# Patient Record
Sex: MALE | Employment: UNEMPLOYED | URBAN - METROPOLITAN AREA
[De-identification: names, ages, dates, MRNs, and addresses within clinical notes are randomized per-mention and may not be internally consistent; named-entity substitution may affect disease eponyms.]

---

## 2017-02-12 ENCOUNTER — APPOINTMENT (OUTPATIENT)
Dept: GENERAL RADIOLOGY | Facility: CLINIC | Age: 47
End: 2017-02-12
Attending: EMERGENCY MEDICINE

## 2017-02-12 ENCOUNTER — HOSPITAL ENCOUNTER (EMERGENCY)
Facility: CLINIC | Age: 47
Discharge: HOME | End: 2017-02-13
Attending: EMERGENCY MEDICINE | Admitting: EMERGENCY MEDICINE

## 2017-02-12 DIAGNOSIS — J32.9 SINUSITIS: ICD-10-CM

## 2017-02-12 DIAGNOSIS — J06.9 UPPER RESPIRATORY TRACT INFECTION, UNSPECIFIED TYPE: ICD-10-CM

## 2017-02-12 PROCEDURE — 99283 EMERGENCY DEPT VISIT LOW MDM: CPT | Mod: Z6 | Performed by: EMERGENCY MEDICINE

## 2017-02-12 PROCEDURE — 99283 EMERGENCY DEPT VISIT LOW MDM: CPT | Performed by: EMERGENCY MEDICINE

## 2017-02-12 PROCEDURE — 70220 X-RAY EXAM OF SINUSES: CPT

## 2017-02-12 RX ORDER — SULFAMETHOXAZOLE/TRIMETHOPRIM 800-160 MG
1 TABLET ORAL 2 TIMES DAILY
Qty: 20 TABLET | Refills: 0 | Status: SHIPPED | OUTPATIENT
Start: 2017-02-12 | End: 2018-12-09

## 2017-02-12 RX ORDER — FLUTICASONE PROPIONATE 50 MCG
2 SPRAY, SUSPENSION (ML) NASAL DAILY
Qty: 1 BOTTLE | Refills: 0 | Status: SHIPPED | OUTPATIENT
Start: 2017-02-12

## 2017-02-12 RX ORDER — HYDROCODONE BITARTRATE AND ACETAMINOPHEN 5; 325 MG/1; MG/1
1-2 TABLET ORAL EVERY 6 HOURS PRN
Qty: 10 TABLET | Refills: 0 | Status: SHIPPED | OUTPATIENT
Start: 2017-02-12

## 2017-02-12 NOTE — ED AVS SNAPSHOT
[facility], [place], Emergency Department    [address]    [facility] [address]    Phone:  [phone]    Fax:  [phone]                                       Zak Bey   MRN: [id number]    Department:  [facility], Emergency Department   Date of Visit:  2/12/2017           After Visit Summary Signature Page     I have received my discharge instructions, and my questions have been answered. I have discussed any challenges I see with this plan with the nurse or doctor.    ..........................................................................................................................................  Patient/Patient Representative Signature      ..........................................................................................................................................  Patient Representative Print Name and Relationship to Patient    ..................................................               ................................................  Date                                            Time    ..........................................................................................................................................  Reviewed by Signature/Title    ...................................................              ..............................................  Date                                                            Time

## 2017-02-12 NOTE — ED AVS SNAPSHOT
[facility], Emergency Department    [address] [place] [address]    Phone:  [phone]    Fax:  [phone]                                       Zak Bey   MRN: [id number]    Department:  [facility], Emergency Department   Date of Visit:  2/12/2017           Patient Information     Date Of Birth          1970        Your diagnoses for this visit were:     Upper respiratory tract infection, unspecified type with partially treated sinusitis       You were seen by Michael Youssef MD.        Discharge Instructions       Please make an appointment to follow up with Your Primary Care Provider in 10-14 days for recheck.    Discharge References/Attachments     SINUSITIS (ANTIBIOTIC TREATMENT) ([place])      24 Hour Appointment Hotline       To make an appointment at any [facility] clinic, call [phone] ([phone]). If you don't have a family doctor or clinic, we will help you find one. [facility] clinics are conveniently located to serve the needs of you and your family.             Review of your medicines      START taking        Dose / Directions Last dose taken    ENTEX LA  MG Tb12   Dose:  1 tablet   Quantity:  10 tablet   Generic drug:  Phenylephrine-Guaifenesin        Take 1 tablet by mouth 2 times daily for 5 days   Refills:  0        sulfamethoxazole-trimethoprim 800-160 MG per tablet   Commonly known as:  BACTRIM DS/SEPTRA DS   Dose:  1 tablet   Quantity:  20 tablet        Take 1 tablet by mouth 2 times daily   Refills:  0          CONTINUE these medicines which may have CHANGED, or have new prescriptions. If we are uncertain of the size of tablets/capsules you have at home, strength may be listed as something that might have changed.        Dose / Directions Last dose taken    fluticasone 50 MCG/ACT spray   Commonly known as:  FLONASE   Dose:  2 spray   What changed:  how much to take   Quantity:  1 Bottle        Spray 2 sprays into both  nostrils daily   Refills:  0        HYDROcodone-acetaminophen 5-325 MG per tablet   Commonly known as:  NORCO   Dose:  1-2 tablet   What changed:  how much to take   Quantity:  10 tablet        Take 1-2 tablets by mouth every 6 hours as needed for moderate to severe pain   Refills:  0          Our records show that you are taking the medicines listed below. If these are incorrect, please call your family doctor or clinic.        Dose / Directions Last dose taken    acetaminophen 325 MG tablet   Commonly known as:  TYLENOL   Dose:  650 mg   Quantity:  60 tablet        Take 2 tablets (650 mg) by mouth every 6 hours as needed for mild pain   Refills:  0        cetirizine 10 MG tablet   Commonly known as:  zyrTEC   Dose:  10 mg        Take 10 mg by mouth daily   Refills:  0        famotidine 20 MG tablet   Commonly known as:  PEPCID   Dose:  20 mg   Quantity:  30 tablet        Take 1 tablet (20 mg) by mouth 2 times daily   Refills:  0        ipratropium 0.06 % spray   Commonly known as:  ATROVENT   Dose:  2 spray   Quantity:  1 Box        Spray 2 sprays into both nostrils 4 times daily as needed for rhinitis   Refills:  1        sodium chloride 0.65 % nasal spray   Commonly known as:  NASAL MOIST   Dose:  2 spray   Quantity:  1 Bottle        Spray 2 sprays into both nostrils daily as needed for congestion   Refills:  0          STOP taking        Dose Reason for stopping Comments    oxyCODONE 5 MG IR tablet   Commonly known as:  ROXICODONE              ZITHROMAX PO                      Prescriptions were sent or printed at these locations (4 Prescriptions)                   Other Prescriptions                Printed at Department/Unit printer (4 of 4)         fluticasone (FLONASE) 50 MCG/ACT spray               sulfamethoxazole-trimethoprim (BACTRIM DS/SEPTRA DS) 800-160 MG per tablet               ENTEX LA  MG TB12               HYDROcodone-acetaminophen (NORCO) 5-325 MG per tablet                Procedures and  "tests performed during your visit     XR Sinus Complete G/E 3 Views      Orders Needing Specimen Collection     None      Pending Results     Date and Time Order Name Status Description    2017 2236 XR Sinus Complete G/E 3 Views Preliminary             Pending Culture Results     No orders found from 2/10/2017 to 2017.            Thank you for choosing [facility]       Thank you for choosing [facility] for your care. Our goal is always to provide you with excellent care. Hearing back from our patients is one way we can continue to improve our services. Please take a few minutes to complete the written survey that you may receive in the mail after you visit with us. Thank you!        [employer] Information     [employer] lets you send messages to your doctor, view your test results, renew your prescriptions, schedule appointments and more. To sign up, go to [email or web address] . Click on \"Log in\" on the left side of the screen, which will take you to the Welcome page. Then click on \"Sign up Now\" on the right side of the page.     You will be asked to enter the access code listed below, as well as some personal information. Please follow the directions to create your username and password.     Your access code is: [email or web address]  Expires: 2017 11:34 PM     Your access code will  in 90 days. If you need help or a new code, please call your [facility] clinic or [phone].        Care EveryWhere ID     This is your Care EveryWhere ID. This could be used by other organizations to access your [facility] medical records  [id number]        After Visit Summary       This is your record. Keep this with you and show to your community pharmacist(s) and doctor(s) at your next visit.                  "

## 2017-02-13 VITALS
SYSTOLIC BLOOD PRESSURE: 129 MMHG | DIASTOLIC BLOOD PRESSURE: 89 MMHG | OXYGEN SATURATION: 93 % | HEIGHT: 69 IN | BODY MASS INDEX: 32.73 KG/M2 | HEART RATE: 78 BPM | RESPIRATION RATE: 20 BRPM | WEIGHT: 221 LBS | TEMPERATURE: 97.7 F

## 2017-02-13 NOTE — DISCHARGE INSTRUCTIONS
Please make an appointment to follow up with Your Primary Care Provider in 10-14 days for recheck.

## 2017-02-13 NOTE — ED NOTES
Sinusitis started 1 1/2 weeks ago. Monday of last week went to [facility] and received ABX, symptoms have gotten worse since then. Also c/o headache and being unable to sleep due to pain.

## 2017-02-13 NOTE — ED PROVIDER NOTES
History     Chief Complaint   Patient presents with     Sinusitis     Sinusitis started 1 1/2 weeks ago. Monday of last week went to [facility] and received ABX, symptoms have gotten worse since then. Also c/o headache and being unable to sleep due to pain.      HPI  Zak Bey is a 46 year old male who states that over the last w3eek and a half he has had sinus congestion and pain. The patient states that he has been treated with Zithromax recently but it has not helped. The patient complains mostly of sinus congestion but also states that he has some sore throat as well. The patient has had no vomiting, no coughing, no shortness of breath and denies any generalized headaches stating that all of his pressure and pain is in his sinuses.    I have reviewed the Medications, Allergies, Past Medical and Surgical History, and Social History in the Epic system.    This part of the document was transcribed by Ky Stokes for Michael Youssef MD.    Previous Medications    ACETAMINOPHEN (TYLENOL) 325 MG TABLET    Take 2 tablets (650 mg) by mouth every 6 hours as needed for mild pain    CETIRIZINE (ZYRTEC) 10 MG TABLET    Take 10 mg by mouth daily    FAMOTIDINE (PEPCID) 20 MG TABLET    Take 1 tablet (20 mg) by mouth 2 times daily    IPRATROPIUM (ATROVENT) 0.06 % NASAL SPRAY    Spray 2 sprays into both nostrils 4 times daily as needed for rhinitis    SODIUM CHLORIDE (NASAL MOIST) 0.65 % NASAL SPRAY    Spray 2 sprays into both nostrils daily as needed for congestion     Past Medical History   Diagnosis Date     Depressive disorder        Past Surgical History   Procedure Laterality Date     Orthopedic surgery       back surgery       No family history on file.    Social History   Substance Use Topics     Smoking status: Former Smoker     Quit date: 4/28/2003     Smokeless tobacco: Not on file     Alcohol use Yes      Comment: sometimes        Allergies   Allergen Reactions     Penicillins        Review of  "Systems   ROS: 10 point ROS neg other than the symptoms noted above in the HPI.    Physical Exam   BP: 154/86  Heart Rate: 85  Temp: 96.3  F (35.7  C)  Resp: 16  Height: 175 cm (5' 8.9\")  Weight: 100.2 kg (221 lb)  SpO2: 95 %  Physical Exam   Constitutional: He is oriented to person, place, and time.   Alert and conversant pleasant   HENT:   Head: Atraumatic.   Mouth/Throat: Oropharynx is clear and moist.   There is sinus tenderness to percussion in the frontal and maxillary sinuses bilaterally.   Eyes: EOM are normal. Pupils are equal, round, and reactive to light.   Neck: Neck supple.   Cardiovascular: Normal heart sounds.    Pulmonary/Chest: Breath sounds normal. He has no wheezes.   Musculoskeletal: He exhibits no edema.   Neurological: He is alert and oriented to person, place, and time.   Grossly intact and symmetric   Skin: Skin is warm.   Psychiatric: He has a normal mood and affect.       ED Course     ED Course     Procedures            Assessments & Plan (with Medical Decision Making)     I have reviewed the nursing notes.    Patient's sinus x-rays revealed some possible mucosal swelling but no definite air-fluid levels and the patient sinuses.  This may represent a partially treated sinusitis since the patient has been on Zithromax.  Prescription database was accessed and found no evidence of abuse of pain medicines.    I have reviewed the findings, diagnosis, plan and need for follow up with the patient.    Patient's Medications   New Prescriptions    ENTEX LA  MG TB12    Take 1 tablet by mouth 2 times daily for 5 days    HYDROCODONE-ACETAMINOPHEN (NORCO) 5-325 MG PER TABLET    Take 1-2 tablets by mouth every 6 hours as needed for moderate to severe pain    SULFAMETHOXAZOLE-TRIMETHOPRIM (BACTRIM DS/SEPTRA DS) 800-160 MG PER TABLET    Take 1 tablet by mouth 2 times daily   Previous Medications    ACETAMINOPHEN (TYLENOL) 325 MG TABLET    Take 2 tablets (650 mg) by mouth every 6 hours as needed for " mild pain    CETIRIZINE (ZYRTEC) 10 MG TABLET    Take 10 mg by mouth daily    FAMOTIDINE (PEPCID) 20 MG TABLET    Take 1 tablet (20 mg) by mouth 2 times daily    IPRATROPIUM (ATROVENT) 0.06 % NASAL SPRAY    Spray 2 sprays into both nostrils 4 times daily as needed for rhinitis    SODIUM CHLORIDE (NASAL MOIST) 0.65 % NASAL SPRAY    Spray 2 sprays into both nostrils daily as needed for congestion   Modified Medications    Modified Medication Previous Medication    FLUTICASONE (FLONASE) 50 MCG/ACT SPRAY fluticasone (FLONASE) 50 MCG/ACT nasal spray       Spray 2 sprays into both nostrils daily    Spray 1-2 sprays into both nostrils daily   Discontinued Medications    AZITHROMYCIN (ZITHROMAX PO)        HYDROCODONE-ACETAMINOPHEN (NORCO) 5-325 MG PER TABLET    Take 1 tablet by mouth every 6 hours as needed for moderate to severe pain    OXYCODONE (ROXICODONE) 5 MG IMMEDIATE RELEASE TABLET    Take 1 tablet (5 mg) by mouth every 6 hours as needed for pain       Final diagnoses:   Upper respiratory tract infection, unspecified type - with partially treated sinusitis     Please make an appointment to follow up with Your Primary Care Provider in 10-14 days for recheck.    Routine discharge instructions were given for this diagnosis    Michael Youssef MD    2/12/2017   [facility], [place], EMERGENCY DEPARTMENT     Michael Youssef MD  02/12/17 9126

## 2018-12-09 ENCOUNTER — HOSPITAL ENCOUNTER (EMERGENCY)
Facility: CLINIC | Age: 48
Discharge: HOME | End: 2018-12-09
Attending: EMERGENCY MEDICINE | Admitting: EMERGENCY MEDICINE
Payer: MEDICAID

## 2018-12-09 ENCOUNTER — APPOINTMENT (OUTPATIENT)
Dept: GENERAL RADIOLOGY | Facility: CLINIC | Age: 48
End: 2018-12-09
Attending: EMERGENCY MEDICINE
Payer: MEDICAID

## 2018-12-09 VITALS
WEIGHT: 214.25 LBS | TEMPERATURE: 97.8 F | BODY MASS INDEX: 31.73 KG/M2 | RESPIRATION RATE: 16 BRPM | DIASTOLIC BLOOD PRESSURE: 91 MMHG | SYSTOLIC BLOOD PRESSURE: 139 MMHG | HEART RATE: 70 BPM | OXYGEN SATURATION: 99 %

## 2018-12-09 DIAGNOSIS — Z87.891 PERSONAL HISTORY OF TOBACCO USE, PRESENTING HAZARDS TO HEALTH: ICD-10-CM

## 2018-12-09 DIAGNOSIS — J18.9 PNEUMONIA OF RIGHT UPPER LOBE DUE TO INFECTIOUS ORGANISM: ICD-10-CM

## 2018-12-09 PROCEDURE — 71046 X-RAY EXAM CHEST 2 VIEWS: CPT

## 2018-12-09 PROCEDURE — 99284 EMERGENCY DEPT VISIT MOD MDM: CPT | Mod: Z6 | Performed by: EMERGENCY MEDICINE

## 2018-12-09 PROCEDURE — 99283 EMERGENCY DEPT VISIT LOW MDM: CPT | Mod: 25

## 2018-12-09 RX ORDER — AZITHROMYCIN 250 MG/1
TABLET, FILM COATED ORAL
Qty: 6 TABLET | Refills: 0 | Status: SHIPPED | OUTPATIENT
Start: 2018-12-09

## 2018-12-09 ASSESSMENT — ENCOUNTER SYMPTOMS
SHORTNESS OF BREATH: 1
HEADACHES: 1
CHILLS: 1
ABDOMINAL PAIN: 0
RHINORRHEA: 1
VOMITING: 0
FEVER: 0
BACK PAIN: 0
SORE THROAT: 1
COUGH: 1
NAUSEA: 0

## 2018-12-09 NOTE — ED PROVIDER NOTES
History     Chief Complaint   Patient presents with     Pharyngitis     has had a headache and sore throat for the 2 months worse this pasts 1 week      HPI  Zak Bey is a 48 year old male Presents to the Emergency Department with complaints of sore throat, runny nose, cough, headache, and chills for the past two months that has worsened in the last week. He presents with his son who translates for him.  He also feels like his lungs are closing in on him and it s hard for him to breathe. He only has allergies to penicillin. He would like to try antibiotics.     Of note, the patient was seen last month, 11/05 and a CT was performed and states they gave him nothing for symptomatic relief. He was also seen by ENT 10/31/18 and was diagnosed with allergic rhinitis and chronic sinusitis.     CT sinuses no IV contrast:  Impression:     Mild mucosal thickening in the paranasal sinuses. No air-fluid levels to suggest acute sinusitis. Mild mucosal thickening along the right infundibulum without obliteration of the ostiomeatal unit.     I have reviewed the Medications, Allergies, Past Medical and Surgical History, and Social History in the Shortlist system.  Past Medical History:   Diagnosis Date     Depressive disorder        Past Surgical History:   Procedure Laterality Date     ORTHOPEDIC SURGERY      back surgery       No family history on file.    Social History     Tobacco Use     Smoking status: Former Smoker     Last attempt to quit: 4/28/2003     Years since quitting: 15.6     Smokeless tobacco: Never Used   Substance Use Topics     Alcohol use: Yes     Comment: sometimes       No current facility-administered medications for this encounter.      Current Outpatient Medications   Medication     acetaminophen (TYLENOL) 325 MG tablet     azithromycin (ZITHROMAX Z-WAN) 250 MG tablet     famotidine (PEPCID) 20 MG tablet     cetirizine (ZYRTEC) 10 MG tablet     cetirizine (ZYRTEC) 10 MG tablet     fluticasone  (FLONASE) 50 MCG/ACT spray     HYDROcodone-acetaminophen (NORCO) 5-325 MG per tablet     ibuprofen (ADVIL,MOTRIN) 800 MG tablet     ipratropium (ATROVENT) 0.06 % nasal spray     sodium chloride (NASAL MOIST) 0.65 % nasal spray        Allergies   Allergen Reactions     Penicillins        Review of Systems   Constitutional: Positive for chills. Negative for fever.   HENT: Positive for congestion, rhinorrhea and sore throat.    Respiratory: Positive for cough and shortness of breath.    Cardiovascular: Negative for chest pain.   Gastrointestinal: Negative for abdominal pain, nausea and vomiting.   Musculoskeletal: Negative for back pain.   Neurological: Positive for headaches.   All other systems reviewed and are negative.      Physical Exam   BP: (!) 148/95  Pulse: 70  Heart Rate: 57  Temp: 97.8  F (36.6  C)  Resp: 16  Weight: 97.2 kg (214 lb 4 oz)  SpO2: 96 %      Physical Exam   Constitutional: He is oriented to person, place, and time. He appears well-developed.   HENT:   Head: Normocephalic.   Eyes: Pupils are equal, round, and reactive to light.   Neck: Normal range of motion. Neck supple.   Cardiovascular: Normal rate and regular rhythm.   Pulmonary/Chest: Effort normal. He has rhonchi ( Scattered).   Abdominal: Soft. Bowel sounds are normal.   Musculoskeletal: Normal range of motion.   Neurological: He is alert and oriented to person, place, and time.   Skin: Skin is warm and dry. Capillary refill takes less than 2 seconds.   Psychiatric: He has a normal mood and affect.       ED Course        Procedures             Results for orders placed or performed during the hospital encounter of 12/09/18   XR Chest 2 Views    Narrative    CHEST TWO VIEWS  12/9/2018 12:50 PM     HISTORY: Chest pain.    COMPARISON: 9/5/2015      Impression    IMPRESSION: New left lower lobe infiltrate consistent with pneumonia.  Otherwise normal.    ALFONZO BENITES MD       Labs Ordered and Resulted from Time of ED Arrival Up to the Time  of Departure from the ED - No data to display         Assessments & Plan (with Medical Decision Making)   48-year-old male presents with nearly 2-month history of intermittent URI type symptoms.  Exam revealed normal vital signs with scattered rhonchi.  Differential includes URI, sinusitis, asthma, bronchospasm, pneumonia, pneumonitis.  Chest x-ray was obtained revealing new left lower lobe infiltrate.  Signs and symptoms consistent with pneumonia.  Patient will be discharged with instructions on use of azithromycin.    I have reviewed the nursing notes.    I have reviewed the findings, diagnosis, plan and need for follow up with the patient.       Review of your medicines      UNREVIEWED medicines. Ask your doctor about these medicines      Dose / Directions   acetaminophen 325 MG tablet  Commonly known as:  TYLENOL      Dose:  650 mg  Take 2 tablets (650 mg) by mouth every 6 hours as needed for mild pain  Quantity:  60 tablet  Refills:  0     * cetirizine 10 MG tablet  Commonly known as:  zyrTEC      Dose:  10 mg  Take 10 mg by mouth daily  Refills:  0     * cetirizine 10 MG tablet  Commonly known as:  zyrTEC      Dose:  10 mg  Take 1 tablet by mouth daily for 30 days.  Quantity:  30 tablet  Refills:  0     famotidine 20 MG tablet  Commonly known as:  PEPCID      Dose:  20 mg  Take 1 tablet (20 mg) by mouth 2 times daily  Quantity:  30 tablet  Refills:  0     fluticasone 50 MCG/ACT nasal spray  Commonly known as:  FLONASE      Dose:  2 spray  Spray 2 sprays into both nostrils daily  Quantity:  1 Bottle  Refills:  0     HYDROcodone-acetaminophen 5-325 MG tablet  Commonly known as:  NORCO      Dose:  1-2 tablet  Take 1-2 tablets by mouth every 6 hours as needed for moderate to severe pain  Quantity:  10 tablet  Refills:  0     ibuprofen 800 MG tablet  Commonly known as:  ADVIL/MOTRIN      Dose:  800 mg  Take 1 tablet by mouth every 8 hours as needed for pain for 8 days.  Quantity:  24 tablet  Refills:  0      ipratropium 0.06 % nasal spray  Commonly known as:  ATROVENT      Dose:  2 spray  Spray 2 sprays into both nostrils 4 times daily as needed for rhinitis  Quantity:  1 Box  Refills:  1     sodium chloride 0.65 % nasal spray  Commonly known as:  NASAL MOIST      Dose:  2 spray  Spray 2 sprays into both nostrils daily as needed for congestion  Quantity:  1 Bottle  Refills:  0         * This list has 2 medication(s) that are the same as other medications prescribed for you. Read the directions carefully, and ask your doctor or other care provider to review them with you.            START taking      Dose / Directions   azithromycin 250 MG tablet  Commonly known as:  ZITHROMAX Z-WAN      Take to the first day then 1 every day until gone  Quantity:  6 tablet  Refills:  0           Where to get your medicines      Some of these will need a paper prescription and others can be bought over the counter. Ask your nurse if you have questions.    Bring a paper prescription for each of these medications    azithromycin 250 MG tablet         Final diagnoses:   Pneumonia of right upper lobe due to infectious organism (H)     Sharyn MONGE, am serving as a trained medical scribe to document services personally performed by Erick Valle MD, based on the provider's statements to me.   Erick MONGE MD, was physically present and have reviewed and verified the accuracy of this note documented by Sharyn Montes De Oca.    12/9/2018   [facility], [place], EMERGENCY DEPARTMENT     Gonzales Valle MD  12/09/18 0990

## 2019-07-01 ENCOUNTER — APPOINTMENT (OUTPATIENT)
Dept: GENERAL RADIOLOGY | Facility: CLINIC | Age: 49
End: 2019-07-01
Attending: EMERGENCY MEDICINE
Payer: MEDICAID

## 2019-07-01 ENCOUNTER — APPOINTMENT (OUTPATIENT)
Dept: CT IMAGING | Facility: CLINIC | Age: 49
End: 2019-07-01
Attending: EMERGENCY MEDICINE
Payer: MEDICAID

## 2019-07-01 ENCOUNTER — HOSPITAL ENCOUNTER (EMERGENCY)
Facility: CLINIC | Age: 49
Discharge: HOME | End: 2019-07-01
Attending: EMERGENCY MEDICINE | Admitting: EMERGENCY MEDICINE
Payer: MEDICAID

## 2019-07-01 VITALS
HEART RATE: 72 BPM | DIASTOLIC BLOOD PRESSURE: 81 MMHG | WEIGHT: 214.2 LBS | SYSTOLIC BLOOD PRESSURE: 138 MMHG | BODY MASS INDEX: 31.73 KG/M2 | TEMPERATURE: 97.8 F | RESPIRATION RATE: 16 BRPM | OXYGEN SATURATION: 98 %

## 2019-07-01 DIAGNOSIS — I10 HTN (HYPERTENSION), BENIGN: ICD-10-CM

## 2019-07-01 DIAGNOSIS — M54.2 NECK PAIN: ICD-10-CM

## 2019-07-01 DIAGNOSIS — M54.6 ACUTE THORACIC BACK PAIN, UNSPECIFIED BACK PAIN LATERALITY: ICD-10-CM

## 2019-07-01 DIAGNOSIS — M79.10 MYALGIA: ICD-10-CM

## 2019-07-01 LAB
ALBUMIN SERPL-MCNC: 3.8 G/DL (ref 3.4–5)
ALP SERPL-CCNC: 85 U/L (ref 40–150)
ALT SERPL W P-5'-P-CCNC: 45 U/L (ref 0–70)
ANION GAP SERPL CALCULATED.3IONS-SCNC: 5 MMOL/L (ref 3–14)
AST SERPL W P-5'-P-CCNC: 22 U/L (ref 0–45)
BASOPHILS # BLD AUTO: 0 10E9/L (ref 0–0.2)
BASOPHILS NFR BLD AUTO: 0.4 %
BILIRUB SERPL-MCNC: 0.4 MG/DL (ref 0.2–1.3)
BUN SERPL-MCNC: 11 MG/DL (ref 7–30)
CALCIUM SERPL-MCNC: 8.3 MG/DL (ref 8.5–10.1)
CHLORIDE SERPL-SCNC: 108 MMOL/L (ref 94–109)
CO2 SERPL-SCNC: 29 MMOL/L (ref 20–32)
CREAT SERPL-MCNC: 0.96 MG/DL (ref 0.66–1.25)
DIFFERENTIAL METHOD BLD: ABNORMAL
EOSINOPHIL # BLD AUTO: 0.8 10E9/L (ref 0–0.7)
EOSINOPHIL NFR BLD AUTO: 9.1 %
ERYTHROCYTE [DISTWIDTH] IN BLOOD BY AUTOMATED COUNT: 12.6 % (ref 10–15)
GFR SERPL CREATININE-BSD FRML MDRD: >90 ML/MIN/{1.73_M2}
GLUCOSE SERPL-MCNC: 86 MG/DL (ref 70–99)
HCT VFR BLD AUTO: 42.6 % (ref 40–53)
HGB BLD-MCNC: 14.9 G/DL (ref 13.3–17.7)
IMM GRANULOCYTES # BLD: 0 10E9/L (ref 0–0.4)
IMM GRANULOCYTES NFR BLD: 0.4 %
LYMPHOCYTES # BLD AUTO: 2.8 10E9/L (ref 0.8–5.3)
LYMPHOCYTES NFR BLD AUTO: 32.9 %
MCH RBC QN AUTO: 31.7 PG (ref 26.5–33)
MCHC RBC AUTO-ENTMCNC: 35 G/DL (ref 31.5–36.5)
MCV RBC AUTO: 91 FL (ref 78–100)
MONOCYTES # BLD AUTO: 0.5 10E9/L (ref 0–1.3)
MONOCYTES NFR BLD AUTO: 5.9 %
NEUTROPHILS # BLD AUTO: 4.3 10E9/L (ref 1.6–8.3)
NEUTROPHILS NFR BLD AUTO: 51.3 %
NRBC # BLD AUTO: 0 10*3/UL
NRBC BLD AUTO-RTO: 0 /100
PLATELET # BLD AUTO: 193 10E9/L (ref 150–450)
POTASSIUM SERPL-SCNC: 3.6 MMOL/L (ref 3.4–5.3)
PROT SERPL-MCNC: 6.9 G/DL (ref 6.8–8.8)
RBC # BLD AUTO: 4.7 10E12/L (ref 4.4–5.9)
SODIUM SERPL-SCNC: 142 MMOL/L (ref 133–144)
TROPONIN I SERPL-MCNC: <0.015 UG/L (ref 0–0.04)
WBC # BLD AUTO: 8.4 10E9/L (ref 4–11)

## 2019-07-01 PROCEDURE — 25800030 ZZH RX IP 258 OP 636: Performed by: STUDENT IN AN ORGANIZED HEALTH CARE EDUCATION/TRAINING PROGRAM

## 2019-07-01 PROCEDURE — 25000128 H RX IP 250 OP 636: Performed by: STUDENT IN AN ORGANIZED HEALTH CARE EDUCATION/TRAINING PROGRAM

## 2019-07-01 PROCEDURE — 72072 X-RAY EXAM THORAC SPINE 3VWS: CPT

## 2019-07-01 PROCEDURE — 96361 HYDRATE IV INFUSION ADD-ON: CPT | Performed by: EMERGENCY MEDICINE

## 2019-07-01 PROCEDURE — 84484 ASSAY OF TROPONIN QUANT: CPT | Performed by: STUDENT IN AN ORGANIZED HEALTH CARE EDUCATION/TRAINING PROGRAM

## 2019-07-01 PROCEDURE — 25000128 H RX IP 250 OP 636: Performed by: EMERGENCY MEDICINE

## 2019-07-01 PROCEDURE — 72125 CT NECK SPINE W/O DYE: CPT

## 2019-07-01 PROCEDURE — 96372 THER/PROPH/DIAG INJ SC/IM: CPT | Performed by: EMERGENCY MEDICINE

## 2019-07-01 PROCEDURE — 85025 COMPLETE CBC W/AUTO DIFF WBC: CPT | Performed by: STUDENT IN AN ORGANIZED HEALTH CARE EDUCATION/TRAINING PROGRAM

## 2019-07-01 PROCEDURE — 96360 HYDRATION IV INFUSION INIT: CPT | Mod: 59 | Performed by: EMERGENCY MEDICINE

## 2019-07-01 PROCEDURE — 25800030 ZZH RX IP 258 OP 636: Performed by: EMERGENCY MEDICINE

## 2019-07-01 PROCEDURE — 99285 EMERGENCY DEPT VISIT HI MDM: CPT | Mod: Z6 | Performed by: EMERGENCY MEDICINE

## 2019-07-01 PROCEDURE — 99285 EMERGENCY DEPT VISIT HI MDM: CPT | Mod: 25 | Performed by: EMERGENCY MEDICINE

## 2019-07-01 PROCEDURE — 80053 COMPREHEN METABOLIC PANEL: CPT | Performed by: STUDENT IN AN ORGANIZED HEALTH CARE EDUCATION/TRAINING PROGRAM

## 2019-07-01 PROCEDURE — 71260 CT THORAX DX C+: CPT

## 2019-07-01 RX ORDER — SODIUM CHLORIDE 9 MG/ML
1000 INJECTION, SOLUTION INTRAVENOUS CONTINUOUS
Status: DISCONTINUED | OUTPATIENT
Start: 2019-07-01 | End: 2019-07-01 | Stop reason: HOSPADM

## 2019-07-01 RX ORDER — IOPAMIDOL 755 MG/ML
100 INJECTION, SOLUTION INTRAVASCULAR ONCE
Status: COMPLETED | OUTPATIENT
Start: 2019-07-01 | End: 2019-07-01

## 2019-07-01 RX ORDER — CYCLOBENZAPRINE HCL 10 MG
10 TABLET ORAL 3 TIMES DAILY PRN
Qty: 20 TABLET | Refills: 0 | Status: SHIPPED | OUTPATIENT
Start: 2019-07-01 | End: 2019-07-07

## 2019-07-01 RX ORDER — SODIUM CHLORIDE 9 MG/ML
100 INJECTION, SOLUTION INTRAVENOUS ONCE
Status: COMPLETED | OUTPATIENT
Start: 2019-07-01 | End: 2019-07-01

## 2019-07-01 RX ORDER — KETOROLAC TROMETHAMINE 15 MG/ML
15 INJECTION, SOLUTION INTRAMUSCULAR; INTRAVENOUS ONCE
Status: COMPLETED | OUTPATIENT
Start: 2019-07-01 | End: 2019-07-01

## 2019-07-01 RX ADMIN — IOPAMIDOL 72 ML: 755 INJECTION, SOLUTION INTRAVENOUS at 20:13

## 2019-07-01 RX ADMIN — SODIUM CHLORIDE 1000 ML: 9 INJECTION, SOLUTION INTRAVENOUS at 18:48

## 2019-07-01 RX ADMIN — SODIUM CHLORIDE 90 ML: 9 INJECTION, SOLUTION INTRAVENOUS at 20:13

## 2019-07-01 RX ADMIN — HYDROMORPHONE HYDROCHLORIDE 1 MG: 1 INJECTION, SOLUTION INTRAMUSCULAR; INTRAVENOUS; SUBCUTANEOUS at 18:21

## 2019-07-01 RX ADMIN — KETOROLAC TROMETHAMINE 15 MG: 15 INJECTION, SOLUTION INTRAMUSCULAR; INTRAVENOUS at 18:19

## 2019-07-01 RX ADMIN — SODIUM CHLORIDE 1000 ML: 9 INJECTION, SOLUTION INTRAVENOUS at 20:27

## 2019-07-01 ASSESSMENT — ENCOUNTER SYMPTOMS
FEVER: 0
FATIGUE: 0
NECK STIFFNESS: 0
CHOKING: 0
ABDOMINAL PAIN: 0
BACK PAIN: 1
NECK PAIN: 1
CHEST TIGHTNESS: 0
NAUSEA: 1
ABDOMINAL DISTENTION: 0
SHORTNESS OF BREATH: 0
NUMBNESS: 0

## 2019-07-01 NOTE — ED PROVIDER NOTES
History     Chief Complaint   Patient presents with     Back Pain     Pt c/o (L) upper and lower back pain. Started 2 days ago. No known trauma     HPI  Zak Bey is a 49 year old male who complains of upper back pain that started yesterday. The pain was sudden in onset, burning, 8/10, and moves to his neck sometimes. The pain is worse when he sits and bends to pick items up. He used naproxen yesterday but the pain persisted. When the pain started, he also started to feel nauseous.There's no history of preceding trauma to the neck or upper back. There's normal range of motion in all limbs, no loss of sensation, or  numbness in all limbs.  He also complains of lower back pain that started about 3 months ago. He has used naproxen and steroid injections for the pain but he says that they have not been very helpful.  He is diabetic on metformin. He doesn't drink alcohol or smoke cigarettes    I have reviewed the Medications, Allergies, Past Medical and Surgical History, and Social History in the [employer] system.    Past Medical History:   Diagnosis Date     Depressive disorder        Past Surgical History:   Procedure Laterality Date     ORTHOPEDIC SURGERY      back surgery       History reviewed. No pertinent family history.    Social History     Tobacco Use     Smoking status: Former Smoker     Last attempt to quit: 2003     Years since quittin.1     Smokeless tobacco: Never Used   Substance Use Topics     Alcohol use: Yes     Comment: sometimes         Review of Systems   Constitutional: Negative for fatigue and fever.   Respiratory: Negative for choking, chest tightness and shortness of breath.    Cardiovascular: Negative for chest pain.   Gastrointestinal: Positive for nausea. Negative for abdominal distention and abdominal pain.   Musculoskeletal: Positive for back pain and neck pain. Negative for neck stiffness.   Neurological: Negative for numbness.       Physical Exam   BP:  143/81  Pulse: 72  Heart Rate: 76  Temp: 96.7  F (35.9  C)  Resp: 16  Weight: 97.2 kg (214 lb 3.2 oz)  SpO2: 97 %      Physical Exam   Constitutional: He is oriented to person, place, and time. He appears well-nourished. He is active and cooperative. No distress.   HENT:   Head: Atraumatic.   Eyes: Conjunctivae, EOM and lids are normal.   Neck: Muscular tenderness present.   Cardiovascular: Normal rate, regular rhythm and normal heart sounds.   Pulmonary/Chest: No tachypnea. No respiratory distress.   Abdominal: Normal appearance. There is no tenderness.   Musculoskeletal:        Cervical back: He exhibits tenderness.        Thoracic back: He exhibits tenderness.        Lumbar back: He exhibits tenderness.   Neurological: He is alert and oriented to person, place, and time.   Skin: Skin is intact.       ED Course   This is a 49-year-old male that comes in for evaluation of neck and upper back pain that started yesterday.  The patient is a  and he often times gets myalgias.  He came in to the emergency department complaining of those areas with pain he had work-up to include for PE as well as to look at the lumbar spine in the work-up and the labs are all reassuring based on the history and physical examination I suspect that he is good enough to go home with pain medications and will follow up with Ortho.    This procedure was done with the Resident Dr Mendieta under my direct supervision of the key portions of the procedure.    Dr Doug Kellogg      Medications   HYDROmorphone (DILAUDID) injection 1 mg (1 mg Intramuscular Given 7/1/19 1821)   ketorolac (TORADOL) injection 15 mg (15 mg Intramuscular Given 7/1/19 1819)   0.9% sodium chloride BOLUS (0 mLs Intravenous Stopped 7/1/19 2026)   iopamidol (ISOVUE-370) solution 100 mL (72 mLs Intravenous Given 7/1/19 2013)   sodium chloride 0.9% infusion (0 mLs Intravenous Stopped 7/1/19 2027)       Procedures  None       EKG Interpretation:      Interpreted by  [facility]  Time reviewed: 18:26  Symptoms at time of EKG: Upper back pain  Rhythm: normal sinus   Rate: normal (75bpm)  Axis: normal  Ectopy: none  Conduction: normal  ST Segments/ T Waves: No ST-T wave changes  Q Waves: Present in leads III and aVF  Comparison to prior: Same as old EKG (3/9/19- [facility])    Clinical Impression: Inferior infarct          Critical Care time:  none             Labs Ordered and Resulted from Time of ED Arrival Up to the Time of Departure from the ED   CBC WITH PLATELETS DIFFERENTIAL - Abnormal; Notable for the following components:       Result Value    Absolute Eosinophils 0.8 (*)     All other components within normal limits   COMPREHENSIVE METABOLIC PANEL - Abnormal; Notable for the following components:    Calcium 8.3 (*)     All other components within normal limits   TROPONIN I            Assessments & Plan (with Medical Decision Making)   A 49 year old male with pain in the mid upper third of his back radiating to his lower neck. Initial differential diagnosis include:  Thoracic strain, pulmonary embolism, spinal stenosis, aortic dissection. On exam there was tenderness in the lower neck, mid-upper third of the back, and left lower back. No skin changes or erythema.  Labs were collected. Troponin, CBC, and CMP values are normal. Imaging studies of the neck and back showed mild degenerative changes. Chest CT showed no evidence of pulmonary embolism.  Results for orders placed or performed during the hospital encounter of 07/01/19   Cervical spine CT w/o contrast    Narrative    CT CERVICAL SPINE WITHOUT CONTRAST   7/1/2019 8:11 PM     HISTORY: Neck pain, initial exam; ; Neck pain     TECHNIQUE: Axial images of the cervical spine were obtained without  intravenous contrast. Multiplanar reformations were performed.   Radiation dose for this scan was reduced using automated exposure  control, adjustment of the mA and/or kV according to patient size,  or  iterative reconstruction technique.    COMPARISON: None.    FINDINGS: There is no evidence of fracture. Alignment is normal.      Craniocervical junction: Normal.     C1-C2:  Normal.     C2-C3:  Minimal annular disc bulge. Central canal and neural foramen  are patent.     C3-C4:  Minimal bulge. Central canal and neural foramen are patent.     C4-C5:  Minimal bulge. Central canal and neural foramen are normal.     C5-C6:  Central canal and neural foramen are patent. Soft tissues are  obscured by streak artifact.      C6-C7:  Soft tissues are obscured. Central canal and bony neural  foramen are patent.      C7-T1:   Soft tissues are obscured. Bony central canal and bony neural  foramen are patent.      Impression    IMPRESSION:    1. Negative for fracture.  2. Mild degenerative changes.    ALEKSANDRA FENTON MD   Thoracic spine XR, 3 views    Narrative    THORACIC SPINE THREE VIEWS 7/1/2019 8:15 PM     HISTORY: Pain    COMPARISON: None.    FINDINGS: There is normal osseous alignment.  No fractures are  identified.  There are mild degenerative changes with mild loss of  disc space height end small right lateral osteophytes.      Impression    IMPRESSION: No fractures are identified.    ALEKSANDRA FENTON MD   CT Chest Pulmonary Embolism w Contrast    Narrative    CT CHEST PULMONARY EMBOLISM WITH CONTRAST 7/1/2019 8:14 PM     HISTORY: PE suspected, short of breath. Low pretest probability.     CONTRAST DOSE:  Isovue 370, 72 mL    Radiation dose for this scan was reduced using automated exposure  control, adjustment of the mA and/or kV according to patient size, or  iterative reconstruction technique.    FINDINGS:  There is a good contrast bolus within the pulmonary  arteries. There are no pulmonary arterial filling defects to indicate  pulmonary embolism. No evidence of aortic dissection. The mediastinum  and guilherme appear within normal limits. The lungs appear clear. No  pleural effusion or pneumothorax. Hepatic fatty infiltration  is noted.      Impression    IMPRESSION:  1. No CT evidence of pulmonary embolism.  2. Hepatic fatty infiltration--possible etiologies include consumption  of alcohol or excessive carbohydrate intake, especially  sugar/fructose.  Metabolic syndrome commonly occurs in combination  with nonalcoholic fatty liver disease. Although often reversible,  nonalcoholic fatty liver disease can progress to steatohepatitis and  cirrhosis.         I have reviewed the nursing notes.    I have reviewed the findings, diagnosis, plan and need for follow up with the patient.       Medication List      Started    acetaminophen-codeine 300-30 MG tablet  Commonly known as:  TYLENOL #3  1 tablet, Oral, EVERY 6 HOURS PRN     cyclobenzaprine 10 MG tablet  Commonly known as:  FLEXERIL  10 mg, Oral, 3 TIMES DAILY PRN            Final diagnoses:   Acute thoracic back pain, unspecified back pain laterality   Neck pain   Myalgia   HTN (hypertension), benign     Ironton Anam, PGY-1 Psychiatry Resident  7/1/2019   [facility], [place], EMERGENCY DEPARTMENT     Doug Kellogg MD  07/02/19 1130

## 2019-07-01 NOTE — ED AVS SNAPSHOT
[facility], [place], Emergency Department  [address]  [facility] [address]  Phone:  [phone]  Fax:  [phone]                                    Zak Bey   MRN: [id number]    Department:  [facility], Emergency Department   Date of Visit:  7/1/2019           After Visit Summary Signature Page    I have received my discharge instructions, and my questions have been answered. I have discussed any challenges I see with this plan with the nurse or doctor.    ..........................................................................................................................................  Patient/Patient Representative Signature      ..........................................................................................................................................  Patient Representative Print Name and Relationship to Patient    ..................................................               ................................................  Date                                   Time    ..........................................................................................................................................  Reviewed by Signature/Title    ...................................................              ..............................................  Date                                               Time          22EPIC Rev 08/18

## 2019-07-19 ENCOUNTER — HOSPITAL ENCOUNTER (EMERGENCY)
Facility: CLINIC | Age: 49
Discharge: HOME | End: 2019-07-19
Attending: INTERNAL MEDICINE | Admitting: INTERNAL MEDICINE
Payer: MEDICAID

## 2019-07-19 ENCOUNTER — APPOINTMENT (OUTPATIENT)
Dept: CT IMAGING | Facility: CLINIC | Age: 49
End: 2019-07-19
Attending: INTERNAL MEDICINE
Payer: MEDICAID

## 2019-07-19 VITALS
TEMPERATURE: 99.3 F | OXYGEN SATURATION: 99 % | BODY MASS INDEX: 30.81 KG/M2 | SYSTOLIC BLOOD PRESSURE: 124 MMHG | HEART RATE: 72 BPM | RESPIRATION RATE: 16 BRPM | WEIGHT: 208 LBS | DIASTOLIC BLOOD PRESSURE: 88 MMHG

## 2019-07-19 DIAGNOSIS — R10.9 LEFT FLANK PAIN: ICD-10-CM

## 2019-07-19 DIAGNOSIS — M54.42 ACUTE BILATERAL LOW BACK PAIN WITH LEFT-SIDED SCIATICA: ICD-10-CM

## 2019-07-19 LAB
ABO + RH BLD: NORMAL
ABO + RH BLD: NORMAL
ALBUMIN SERPL-MCNC: 3.9 G/DL (ref 3.4–5)
ALBUMIN UR-MCNC: NEGATIVE MG/DL
ALP SERPL-CCNC: 109 U/L (ref 40–150)
ALT SERPL W P-5'-P-CCNC: 53 U/L (ref 0–70)
ANION GAP SERPL CALCULATED.3IONS-SCNC: 7 MMOL/L (ref 3–14)
APPEARANCE UR: CLEAR
AST SERPL W P-5'-P-CCNC: 26 U/L (ref 0–45)
BASOPHILS # BLD AUTO: 0 10E9/L (ref 0–0.2)
BASOPHILS NFR BLD AUTO: 0.4 %
BILIRUB SERPL-MCNC: 0.3 MG/DL (ref 0.2–1.3)
BILIRUB UR QL STRIP: NEGATIVE
BLD GP AB SCN SERPL QL: NORMAL
BLOOD BANK CMNT PATIENT-IMP: NORMAL
BUN SERPL-MCNC: 12 MG/DL (ref 7–30)
CALCIUM SERPL-MCNC: 8.9 MG/DL (ref 8.5–10.1)
CHLORIDE SERPL-SCNC: 104 MMOL/L (ref 94–109)
CO2 SERPL-SCNC: 31 MMOL/L (ref 20–32)
COLOR UR AUTO: YELLOW
CREAT SERPL-MCNC: 0.81 MG/DL (ref 0.66–1.25)
CRP SERPL-MCNC: <2.9 MG/L (ref 0–8)
DIFFERENTIAL METHOD BLD: NORMAL
EOSINOPHIL # BLD AUTO: 0.6 10E9/L (ref 0–0.7)
EOSINOPHIL NFR BLD AUTO: 8.1 %
ERYTHROCYTE [DISTWIDTH] IN BLOOD BY AUTOMATED COUNT: 12.6 % (ref 10–15)
ERYTHROCYTE [SEDIMENTATION RATE] IN BLOOD BY WESTERGREN METHOD: 3 MM/H (ref 0–20)
GFR SERPL CREATININE-BSD FRML MDRD: >90 ML/MIN/{1.73_M2}
GLUCOSE SERPL-MCNC: 137 MG/DL (ref 70–99)
GLUCOSE UR STRIP-MCNC: NEGATIVE MG/DL
HCT VFR BLD AUTO: 43.3 % (ref 40–53)
HGB BLD-MCNC: 15.6 G/DL (ref 13.3–17.7)
HGB UR QL STRIP: NEGATIVE
IMM GRANULOCYTES # BLD: 0 10E9/L (ref 0–0.4)
IMM GRANULOCYTES NFR BLD: 0.5 %
INR PPP: 1.02 (ref 0.86–1.14)
KETONES UR STRIP-MCNC: NEGATIVE MG/DL
LACTATE BLD-SCNC: 1.1 MMOL/L (ref 0.7–2)
LEUKOCYTE ESTERASE UR QL STRIP: NEGATIVE
LYMPHOCYTES # BLD AUTO: 2.3 10E9/L (ref 0.8–5.3)
LYMPHOCYTES NFR BLD AUTO: 31 %
MCH RBC QN AUTO: 31.8 PG (ref 26.5–33)
MCHC RBC AUTO-ENTMCNC: 36 G/DL (ref 31.5–36.5)
MCV RBC AUTO: 88 FL (ref 78–100)
MONOCYTES # BLD AUTO: 0.5 10E9/L (ref 0–1.3)
MONOCYTES NFR BLD AUTO: 7 %
NEUTROPHILS # BLD AUTO: 3.9 10E9/L (ref 1.6–8.3)
NEUTROPHILS NFR BLD AUTO: 53 %
NITRATE UR QL: NEGATIVE
NRBC # BLD AUTO: 0 10*3/UL
NRBC BLD AUTO-RTO: 0 /100
PH UR STRIP: 6.5 PH (ref 5–7)
PLATELET # BLD AUTO: 247 10E9/L (ref 150–450)
POTASSIUM SERPL-SCNC: 3.6 MMOL/L (ref 3.4–5.3)
PROT SERPL-MCNC: 7.2 G/DL (ref 6.8–8.8)
RBC # BLD AUTO: 4.9 10E12/L (ref 4.4–5.9)
RBC #/AREA URNS AUTO: 1 /HPF (ref 0–2)
SODIUM SERPL-SCNC: 142 MMOL/L (ref 133–144)
SOURCE: NORMAL
SP GR UR STRIP: 1.02 (ref 1–1.03)
SPECIMEN EXP DATE BLD: NORMAL
UROBILINOGEN UR STRIP-MCNC: NORMAL MG/DL (ref 0–2)
WBC # BLD AUTO: 7.4 10E9/L (ref 4–11)
WBC #/AREA URNS AUTO: 1 /HPF (ref 0–5)

## 2019-07-19 PROCEDURE — 25000125 ZZHC RX 250: Performed by: INTERNAL MEDICINE

## 2019-07-19 PROCEDURE — 86901 BLOOD TYPING SEROLOGIC RH(D): CPT | Performed by: INTERNAL MEDICINE

## 2019-07-19 PROCEDURE — 86850 RBC ANTIBODY SCREEN: CPT | Performed by: INTERNAL MEDICINE

## 2019-07-19 PROCEDURE — 85610 PROTHROMBIN TIME: CPT | Performed by: INTERNAL MEDICINE

## 2019-07-19 PROCEDURE — 85652 RBC SED RATE AUTOMATED: CPT | Performed by: INTERNAL MEDICINE

## 2019-07-19 PROCEDURE — 99285 EMERGENCY DEPT VISIT HI MDM: CPT | Mod: 25

## 2019-07-19 PROCEDURE — 99284 EMERGENCY DEPT VISIT MOD MDM: CPT | Mod: Z6 | Performed by: INTERNAL MEDICINE

## 2019-07-19 PROCEDURE — 86140 C-REACTIVE PROTEIN: CPT | Performed by: INTERNAL MEDICINE

## 2019-07-19 PROCEDURE — 25800030 ZZH RX IP 258 OP 636

## 2019-07-19 PROCEDURE — 96361 HYDRATE IV INFUSION ADD-ON: CPT

## 2019-07-19 PROCEDURE — 96374 THER/PROPH/DIAG INJ IV PUSH: CPT | Mod: 59

## 2019-07-19 PROCEDURE — 80053 COMPREHEN METABOLIC PANEL: CPT | Performed by: INTERNAL MEDICINE

## 2019-07-19 PROCEDURE — 83605 ASSAY OF LACTIC ACID: CPT | Performed by: INTERNAL MEDICINE

## 2019-07-19 PROCEDURE — 96375 TX/PRO/DX INJ NEW DRUG ADDON: CPT

## 2019-07-19 PROCEDURE — 74177 CT ABD & PELVIS W/CONTRAST: CPT

## 2019-07-19 PROCEDURE — 25000128 H RX IP 250 OP 636: Performed by: INTERNAL MEDICINE

## 2019-07-19 PROCEDURE — 85025 COMPLETE CBC W/AUTO DIFF WBC: CPT | Performed by: INTERNAL MEDICINE

## 2019-07-19 PROCEDURE — 81001 URINALYSIS AUTO W/SCOPE: CPT | Performed by: INTERNAL MEDICINE

## 2019-07-19 PROCEDURE — 86900 BLOOD TYPING SEROLOGIC ABO: CPT | Performed by: INTERNAL MEDICINE

## 2019-07-19 RX ORDER — METHYLPREDNISOLONE 4 MG
TABLET, DOSE PACK ORAL
Qty: 21 TABLET | Refills: 0 | Status: SHIPPED | OUTPATIENT
Start: 2019-07-19

## 2019-07-19 RX ORDER — SODIUM CHLORIDE 9 MG/ML
INJECTION, SOLUTION INTRAVENOUS
Status: COMPLETED
Start: 2019-07-19 | End: 2019-07-19

## 2019-07-19 RX ORDER — LIDOCAINE 50 MG/G
1 PATCH TOPICAL EVERY 24 HOURS
Qty: 5 PATCH | Refills: 0 | Status: SHIPPED | OUTPATIENT
Start: 2019-07-19

## 2019-07-19 RX ORDER — METOCLOPRAMIDE HYDROCHLORIDE 5 MG/ML
10 INJECTION INTRAMUSCULAR; INTRAVENOUS ONCE
Status: COMPLETED | OUTPATIENT
Start: 2019-07-19 | End: 2019-07-19

## 2019-07-19 RX ORDER — IOPAMIDOL 755 MG/ML
100 INJECTION, SOLUTION INTRAVASCULAR ONCE
Status: COMPLETED | OUTPATIENT
Start: 2019-07-19 | End: 2019-07-19

## 2019-07-19 RX ORDER — METHYLPREDNISOLONE 4 MG
TABLET, DOSE PACK ORAL
Qty: 21 TABLET | Refills: 0 | Status: SHIPPED | OUTPATIENT
Start: 2019-07-19 | End: 2019-07-19

## 2019-07-19 RX ORDER — KETOROLAC TROMETHAMINE 30 MG/ML
30 INJECTION, SOLUTION INTRAMUSCULAR; INTRAVENOUS ONCE
Status: COMPLETED | OUTPATIENT
Start: 2019-07-19 | End: 2019-07-19

## 2019-07-19 RX ADMIN — Medication 1000 ML: at 17:55

## 2019-07-19 RX ADMIN — SODIUM CHLORIDE 1000 ML: 9 INJECTION, SOLUTION INTRAVENOUS at 17:55

## 2019-07-19 RX ADMIN — SODIUM CHLORIDE 65 ML: 9 INJECTION, SOLUTION INTRAVENOUS at 19:21

## 2019-07-19 RX ADMIN — METOCLOPRAMIDE 10 MG: 5 INJECTION, SOLUTION INTRAMUSCULAR; INTRAVENOUS at 17:57

## 2019-07-19 RX ADMIN — IOPAMIDOL 97 ML: 755 INJECTION, SOLUTION INTRAVENOUS at 19:20

## 2019-07-19 RX ADMIN — KETOROLAC TROMETHAMINE 30 MG: 30 INJECTION, SOLUTION INTRAMUSCULAR; INTRAVENOUS at 17:55

## 2019-07-19 ASSESSMENT — ENCOUNTER SYMPTOMS
BACK PAIN: 1
ABDOMINAL PAIN: 0
DIFFICULTY URINATING: 0

## 2019-07-19 NOTE — ED AVS SNAPSHOT
[facility], [place], Emergency Department  [address]  [facility] [address]  Phone:  [phone]  Fax:  [phone]                                    Zak Bey   MRN: [id number]    Department:  [facility], Emergency Department   Date of Visit:  7/19/2019           After Visit Summary Signature Page    I have received my discharge instructions, and my questions have been answered. I have discussed any challenges I see with this plan with the nurse or doctor.    ..........................................................................................................................................  Patient/Patient Representative Signature      ..........................................................................................................................................  Patient Representative Print Name and Relationship to Patient    ..................................................               ................................................  Date                                   Time    ..........................................................................................................................................  Reviewed by Signature/Title    ...................................................              ..............................................  Date                                               Time          22EPIC Rev 08/18

## 2019-07-19 NOTE — ED PROVIDER NOTES
[facility] EMERGENCY DEPARTMENT ([facility])     2019    History     Chief Complaint   Patient presents with     Back Pain     started yesterday: never had this before: no lass of bowel/ bladder; no numbness or tingling   HPI interpreted by family member, per patient's request.   HPI  Zak Bey is a 49 year old male with history notable for chronic low back pain (s/p laminectomy L5-S1 ) and DM2 who presents to the ED with new, onset of left sided lumbar back pain and left lumbar back swelling. Patient states he took some Naproxen prior to arrival without improvement of his pain. He denies urinary changes and abdominal pain.     Per review of Care Everywhere, patient was seen on 19 for new consult with pain clinic for left-sided low back pain. He was also seen by his PCP on 19 for 2 months of ongoing chronic left sided back pain that radiates into his buttock and posterior thigh. He was referred to see PT and did see them on 19 and had no show on 19.     PAST MEDICAL HISTORY  Past Medical History:   Diagnosis Date     Depressive disorder      PAST SURGICAL HISTORY  Past Surgical History:   Procedure Laterality Date     ORTHOPEDIC SURGERY      back surgery     FAMILY HISTORY  No family history on file.  SOCIAL HISTORY  Social History     Tobacco Use     Smoking status: Former Smoker     Last attempt to quit: 2003     Years since quittin.2     Smokeless tobacco: Never Used   Substance Use Topics     Alcohol use: Yes     Comment: sometimes     MEDICATIONS  No current facility-administered medications for this encounter.      Current Outpatient Medications   Medication     lidocaine (LIDODERM) 5 % patch     methylPREDNISolone (MEDROL DOSEPAK) 4 MG tablet therapy pack     acetaminophen (TYLENOL) 325 MG tablet     azithromycin (ZITHROMAX Z-WAN) 250 MG tablet     cetirizine (ZYRTEC) 10 MG tablet     famotidine (PEPCID) 20 MG tablet     fluticasone  (FLONASE) 50 MCG/ACT spray     HYDROcodone-acetaminophen (NORCO) 5-325 MG per tablet     ipratropium (ATROVENT) 0.06 % nasal spray     metFORMIN (GLUCOPHAGE) 500 MG tablet     sodium chloride (NASAL MOIST) 0.65 % nasal spray     ALLERGIES  No Known Allergies     I have reviewed the Medications, Allergies, Past Medical and Surgical History, and Social History in the [employer] system.    Review of Systems   Gastrointestinal: Negative for abdominal pain.   Genitourinary: Negative for difficulty urinating.   Musculoskeletal: Positive for back pain (left sided).   All other systems reviewed and are negative.      Physical Exam   BP: (!) 142/91  Pulse: 94  Temp: 99.3  F (37.4  C)  Resp: 16  Weight: 94.3 kg (208 lb)  SpO2: 95 %      Physical Exam   Constitutional: No distress.   HENT:   Head: Atraumatic.   Mouth/Throat: Oropharynx is clear and moist.   Eyes: Pupils are equal, round, and reactive to light. No scleral icterus.   Neck: Neck supple. No JVD present.   Cardiovascular: Normal rate, regular rhythm, normal heart sounds and intact distal pulses. Exam reveals no gallop and no friction rub.   No murmur heard.  Pulmonary/Chest: Effort normal and breath sounds normal. No stridor. No respiratory distress. He has no wheezes. He has no rales. He exhibits no tenderness.   Abdominal: Soft. Bowel sounds are normal. He exhibits no distension, no pulsatile liver, no fluid wave, no ascites, no pulsatile midline mass and no mass. There is no hepatosplenomegaly. There is tenderness in the left lower quadrant. There is no rigidity, no rebound, no guarding, no CVA tenderness, no tenderness at McBurney's point and negative Belcher's sign. No hernia.       Musculoskeletal: He exhibits no edema or tenderness.   Skin: Skin is warm. No rash noted. He is not diaphoretic.       ED Course        Procedures   5:20 PM  The patient was seen and examined by in Room 18.           Results for orders placed or performed during the hospital encounter of  07/19/19 (from the past 24 hour(s))   UA with Microscopic     Status: None    Collection Time: 07/19/19  5:46 PM   Result Value Ref Range    Color Urine Yellow     Appearance Urine Clear     Glucose Urine Negative NEG^Negative mg/dL    Bilirubin Urine Negative NEG^Negative    Ketones Urine Negative NEG^Negative mg/dL    Specific Gravity Urine 1.017 1.003 - 1.035    Blood Urine Negative NEG^Negative    pH Urine 6.5 5.0 - 7.0 pH    Protein Albumin Urine Negative NEG^Negative mg/dL    Urobilinogen mg/dL Normal 0.0 - 2.0 mg/dL    Nitrite Urine Negative NEG^Negative    Leukocyte Esterase Urine Negative NEG^Negative    Source Unspecified Urine     WBC Urine 1 0 - 5 /HPF    RBC Urine 1 0 - 2 /HPF   CBC with platelets differential     Status: None    Collection Time: 07/19/19  5:52 PM   Result Value Ref Range    WBC 7.4 4.0 - 11.0 10e9/L    RBC Count 4.90 4.4 - 5.9 10e12/L    Hemoglobin 15.6 13.3 - 17.7 g/dL    Hematocrit 43.3 40.0 - 53.0 %    MCV 88 78 - 100 fl    MCH 31.8 26.5 - 33.0 pg    MCHC 36.0 31.5 - 36.5 g/dL    RDW 12.6 10.0 - 15.0 %    Platelet Count 247 150 - 450 10e9/L    Diff Method Automated Method     % Neutrophils 53.0 %    % Lymphocytes 31.0 %    % Monocytes 7.0 %    % Eosinophils 8.1 %    % Basophils 0.4 %    % Immature Granulocytes 0.5 %    Nucleated RBCs 0 0 /100    Absolute Neutrophil 3.9 1.6 - 8.3 10e9/L    Absolute Lymphocytes 2.3 0.8 - 5.3 10e9/L    Absolute Monocytes 0.5 0.0 - 1.3 10e9/L    Absolute Eosinophils 0.6 0.0 - 0.7 10e9/L    Absolute Basophils 0.0 0.0 - 0.2 10e9/L    Abs Immature Granulocytes 0.0 0 - 0.4 10e9/L    Absolute Nucleated RBC 0.0    INR     Status: None    Collection Time: 07/19/19  5:52 PM   Result Value Ref Range    INR 1.02 0.86 - 1.14   ABO/Rh type and screen     Status: None    Collection Time: 07/19/19  5:52 PM   Result Value Ref Range    ABO B     RH(D) Pos     Antibody Screen Neg     Test Valid Only At          [facility]     Specimen Expires 07/22/2019    Comprehensive metabolic panel     Status: Abnormal    Collection Time: 07/19/19  5:52 PM   Result Value Ref Range    Sodium 142 133 - 144 mmol/L    Potassium 3.6 3.4 - 5.3 mmol/L    Chloride 104 94 - 109 mmol/L    Carbon Dioxide 31 20 - 32 mmol/L    Anion Gap 7 3 - 14 mmol/L    Glucose 137 (H) 70 - 99 mg/dL    Urea Nitrogen 12 7 - 30 mg/dL    Creatinine 0.81 0.66 - 1.25 mg/dL    GFR Estimate >90 >60 mL/min/[1.73_m2]    GFR Estimate If Black >90 >60 mL/min/[1.73_m2]    Calcium 8.9 8.5 - 10.1 mg/dL    Bilirubin Total 0.3 0.2 - 1.3 mg/dL    Albumin 3.9 3.4 - 5.0 g/dL    Protein Total 7.2 6.8 - 8.8 g/dL    Alkaline Phosphatase 109 40 - 150 U/L    ALT 53 0 - 70 U/L    AST 26 0 - 45 U/L   Lactic acid whole blood     Status: None    Collection Time: 07/19/19  5:52 PM   Result Value Ref Range    Lactic Acid 1.1 0.7 - 2.0 mmol/L   Erythrocyte sedimentation rate auto     Status: None    Collection Time: 07/19/19  5:52 PM   Result Value Ref Range    Sed Rate 3 0 - 20 mm/h   CRP inflammation     Status: None    Collection Time: 07/19/19  5:52 PM   Result Value Ref Range    CRP Inflammation <2.9 0.0 - 8.0 mg/L   CT Abdomen Pelvis w Contrast     Status: None    Collection Time: 07/19/19  7:25 PM    Narrative    CT ABDOMEN AND PELVIS WITH CONTRAST   7/19/2019 7:25 PM     HISTORY: Left flank pain and swelling.    TECHNIQUE: Axial CT images of the abdomen and pelvis were obtained  following the administration of intravenous contrast with a dosage of  97mL Isovue-370.     Radiation dose for this scan was reduced using automated exposure  control, adjustment of the mA and/or kV according to patient size, or  iterative reconstruction technique.    COMPARISON: 7/1/2019 chest CT, 10/9/2016 abdominopelvic CT.    FINDINGS:   Liver: Mild generalized fatty infiltration of liver parenchyma. No  evidence for focal lesions or cirrhotic morphology. No bile duct  dilatation.    Gallbladder: No calcified gallstones  or inflammatory changes.     Spleen: Normal.    Pancreas: Unremarkable without evidence for pancreatitis, mass, or  ductal dilatation.    Adrenal glands: No abnormal masses.    Kidneys: Normal size without hydronephrosis, urinary tract calculi,  masses, or inflammatory changes. No perinephric fluid/abscess.    Bowel: Bowel loops are decompressed with no evidence for obstruction  or inflammatory changes. Mild stool and chronic diverticular disease  throughout the colon without active diverticulitis, colitis, or  appendicitis.     Lymph nodes: No enlarged abdominal or pelvic lymph nodes.    Peritoneum: No free air or free fluid.    Abdominal wall: No hernia identified.    Bones: No suspicious osseous lesions.    Visualized lung bases: Clear.      Impression    IMPRESSION:   1. No acute or specific findings to account for reported symptoms.  2. No renal inflammatory changes, obstructing calculi, or  hydronephrosis.  3. Chronic diverticular disease without evidence for active  diverticulitis or other inflammatory change.  4. Mild hepatic steatosis.    JAMES MILLER MD           Labs Ordered and Resulted from Time of ED Arrival Up to the Time of Departure from the ED   COMPREHENSIVE METABOLIC PANEL - Abnormal; Notable for the following components:       Result Value    Glucose 137 (*)     All other components within normal limits   CBC WITH PLATELETS DIFFERENTIAL   INR   LACTIC ACID WHOLE BLOOD   ERYTHROCYTE SEDIMENTATION RATE AUTO   CRP INFLAMMATION   ROUTINE UA WITH MICROSCOPIC   ABO/RH TYPE AND SCREEN            Assessments & Plan (with Medical Decision Making)  Left flank pain with ?swelling, labs and ua all neg, CT abd pelvis without acute findings,  Improved after toradol and reglan, will treat as lumbar radiculopathy as in the past, will discharge with medrol dose pack as directed and lidopatch as needed, follow up with his PMD in one week.       I have reviewed the nursing notes.    I have reviewed the  findings, diagnosis, plan and need for follow up with the patient.       Medication List      Started    lidocaine 5 % patch  Commonly known as:  LIDODERM  1 patch, Transdermal, EVERY 24 HOURS, To prevent lidocaine toxicity, patient should be patch free for 12 hrs daily.     methylPREDNISolone 4 MG tablet therapy pack  Commonly known as:  MEDROL DOSEPAK  follow package directions            Final diagnoses:   Left flank pain   Acute bilateral low back pain with left-sided sciatica     Jeferson MONGE, jeni serving as a trained medical scribe to document services personally performed by Alida Ramey MD, based on the provider's statements to me.      Alida MONGE MD, was physically present and have reviewed and verified the accuracy of this note documented by Jeferson Schilling.     7/19/2019   [facility], [place], EMERGENCY DEPARTMENT     Alida Ramey MD  07/19/19 8546

## 2024-12-13 ENCOUNTER — APPOINTMENT (OUTPATIENT)
Dept: GENERAL RADIOLOGY | Facility: CLINIC | Age: 54
End: 2024-12-13
Attending: STUDENT IN AN ORGANIZED HEALTH CARE EDUCATION/TRAINING PROGRAM
Payer: MEDICAID

## 2024-12-13 ENCOUNTER — HOSPITAL ENCOUNTER (EMERGENCY)
Facility: CLINIC | Age: 54
Discharge: HOME | End: 2024-12-14
Attending: STUDENT IN AN ORGANIZED HEALTH CARE EDUCATION/TRAINING PROGRAM | Admitting: STUDENT IN AN ORGANIZED HEALTH CARE EDUCATION/TRAINING PROGRAM
Payer: MEDICAID

## 2024-12-13 DIAGNOSIS — B34.9 ACUTE VIRAL SYNDROME: ICD-10-CM

## 2024-12-13 DIAGNOSIS — J01.00 ACUTE NON-RECURRENT MAXILLARY SINUSITIS: ICD-10-CM

## 2024-12-13 LAB
ALBUMIN SERPL BCG-MCNC: 4.4 G/DL (ref 3.5–5.2)
ALBUMIN UR-MCNC: 10 MG/DL
ALP SERPL-CCNC: 102 U/L (ref 40–150)
ALT SERPL W P-5'-P-CCNC: 37 U/L (ref 0–70)
ANION GAP SERPL CALCULATED.3IONS-SCNC: 11 MMOL/L (ref 7–15)
APPEARANCE UR: CLEAR
AST SERPL W P-5'-P-CCNC: 21 U/L (ref 0–45)
BASOPHILS # BLD AUTO: 0.1 10E3/UL (ref 0–0.2)
BASOPHILS NFR BLD AUTO: 1 %
BILIRUB SERPL-MCNC: 0.3 MG/DL
BILIRUB UR QL STRIP: NEGATIVE
BUN SERPL-MCNC: 20 MG/DL (ref 6–20)
CALCIUM SERPL-MCNC: 9.4 MG/DL (ref 8.8–10.4)
CHLORIDE SERPL-SCNC: 102 MMOL/L (ref 98–107)
COLOR UR AUTO: ABNORMAL
CREAT SERPL-MCNC: 0.83 MG/DL (ref 0.67–1.17)
EGFRCR SERPLBLD CKD-EPI 2021: >90 ML/MIN/1.73M2
EOSINOPHIL # BLD AUTO: 0.5 10E3/UL (ref 0–0.7)
EOSINOPHIL NFR BLD AUTO: 5 %
ERYTHROCYTE [DISTWIDTH] IN BLOOD BY AUTOMATED COUNT: 11.9 % (ref 10–15)
FLUAV RNA SPEC QL NAA+PROBE: NEGATIVE
FLUBV RNA RESP QL NAA+PROBE: NEGATIVE
GLUCOSE SERPL-MCNC: 106 MG/DL (ref 70–99)
GLUCOSE UR STRIP-MCNC: NEGATIVE MG/DL
HCO3 SERPL-SCNC: 27 MMOL/L (ref 22–29)
HCT VFR BLD AUTO: 42.3 % (ref 40–53)
HGB BLD-MCNC: 15.1 G/DL (ref 13.3–17.7)
HGB UR QL STRIP: NEGATIVE
IMM GRANULOCYTES # BLD: 0 10E3/UL
IMM GRANULOCYTES NFR BLD: 0 %
KETONES UR STRIP-MCNC: NEGATIVE MG/DL
LEUKOCYTE ESTERASE UR QL STRIP: NEGATIVE
LIPASE SERPL-CCNC: 43 U/L (ref 13–60)
LYMPHOCYTES # BLD AUTO: 2.6 10E3/UL (ref 0.8–5.3)
LYMPHOCYTES NFR BLD AUTO: 26 %
MCH RBC QN AUTO: 32 PG (ref 26.5–33)
MCHC RBC AUTO-ENTMCNC: 35.7 G/DL (ref 31.5–36.5)
MCV RBC AUTO: 90 FL (ref 78–100)
MONOCYTES # BLD AUTO: 0.9 10E3/UL (ref 0–1.3)
MONOCYTES NFR BLD AUTO: 9 %
MUCOUS THREADS #/AREA URNS LPF: PRESENT /LPF
NEUTROPHILS # BLD AUTO: 6 10E3/UL (ref 1.6–8.3)
NEUTROPHILS NFR BLD AUTO: 60 %
NITRATE UR QL: NEGATIVE
NRBC # BLD AUTO: 0 10E3/UL
NRBC BLD AUTO-RTO: 0 /100
PH UR STRIP: 6.5 [PH] (ref 5–7)
PLATELET # BLD AUTO: 218 10E3/UL (ref 150–450)
POTASSIUM SERPL-SCNC: 4.5 MMOL/L (ref 3.4–5.3)
PROT SERPL-MCNC: 7.1 G/DL (ref 6.4–8.3)
RBC # BLD AUTO: 4.72 10E6/UL (ref 4.4–5.9)
RBC URINE: 1 /HPF
RSV RNA SPEC NAA+PROBE: NEGATIVE
SARS-COV-2 RNA RESP QL NAA+PROBE: NEGATIVE
SODIUM SERPL-SCNC: 140 MMOL/L (ref 135–145)
SP GR UR STRIP: 1.03 (ref 1–1.03)
TROPONIN T SERPL HS-MCNC: <6 NG/L
UROBILINOGEN UR STRIP-MCNC: NORMAL MG/DL
WBC # BLD AUTO: 10 10E3/UL (ref 4–11)
WBC URINE: 1 /HPF

## 2024-12-13 PROCEDURE — 83690 ASSAY OF LIPASE: CPT | Performed by: STUDENT IN AN ORGANIZED HEALTH CARE EDUCATION/TRAINING PROGRAM

## 2024-12-13 PROCEDURE — 99285 EMERGENCY DEPT VISIT HI MDM: CPT | Mod: 25 | Performed by: STUDENT IN AN ORGANIZED HEALTH CARE EDUCATION/TRAINING PROGRAM

## 2024-12-13 PROCEDURE — 82040 ASSAY OF SERUM ALBUMIN: CPT | Performed by: STUDENT IN AN ORGANIZED HEALTH CARE EDUCATION/TRAINING PROGRAM

## 2024-12-13 PROCEDURE — 71046 X-RAY EXAM CHEST 2 VIEWS: CPT

## 2024-12-13 PROCEDURE — 93005 ELECTROCARDIOGRAM TRACING: CPT | Performed by: STUDENT IN AN ORGANIZED HEALTH CARE EDUCATION/TRAINING PROGRAM

## 2024-12-13 PROCEDURE — 84484 ASSAY OF TROPONIN QUANT: CPT | Performed by: STUDENT IN AN ORGANIZED HEALTH CARE EDUCATION/TRAINING PROGRAM

## 2024-12-13 PROCEDURE — 81001 URINALYSIS AUTO W/SCOPE: CPT | Performed by: STUDENT IN AN ORGANIZED HEALTH CARE EDUCATION/TRAINING PROGRAM

## 2024-12-13 PROCEDURE — 99284 EMERGENCY DEPT VISIT MOD MDM: CPT | Performed by: STUDENT IN AN ORGANIZED HEALTH CARE EDUCATION/TRAINING PROGRAM

## 2024-12-13 PROCEDURE — 87637 SARSCOV2&INF A&B&RSV AMP PRB: CPT | Performed by: STUDENT IN AN ORGANIZED HEALTH CARE EDUCATION/TRAINING PROGRAM

## 2024-12-13 PROCEDURE — 85025 COMPLETE CBC W/AUTO DIFF WBC: CPT | Performed by: STUDENT IN AN ORGANIZED HEALTH CARE EDUCATION/TRAINING PROGRAM

## 2024-12-13 PROCEDURE — 36415 COLL VENOUS BLD VENIPUNCTURE: CPT | Performed by: STUDENT IN AN ORGANIZED HEALTH CARE EDUCATION/TRAINING PROGRAM

## 2024-12-13 PROCEDURE — 250N000013 HC RX MED GY IP 250 OP 250 PS 637: Performed by: STUDENT IN AN ORGANIZED HEALTH CARE EDUCATION/TRAINING PROGRAM

## 2024-12-13 PROCEDURE — 81003 URINALYSIS AUTO W/O SCOPE: CPT | Performed by: STUDENT IN AN ORGANIZED HEALTH CARE EDUCATION/TRAINING PROGRAM

## 2024-12-13 PROCEDURE — 93010 ELECTROCARDIOGRAM REPORT: CPT | Performed by: STUDENT IN AN ORGANIZED HEALTH CARE EDUCATION/TRAINING PROGRAM

## 2024-12-13 RX ORDER — ACETAMINOPHEN 325 MG/1
975 TABLET ORAL ONCE
Status: COMPLETED | OUTPATIENT
Start: 2024-12-13 | End: 2024-12-13

## 2024-12-13 RX ADMIN — ACETAMINOPHEN 975 MG: 325 TABLET, FILM COATED ORAL at 22:49

## 2024-12-13 ASSESSMENT — ACTIVITIES OF DAILY LIVING (ADL)
ADLS_ACUITY_SCORE: 41
ADLS_ACUITY_SCORE: 41

## 2024-12-13 ASSESSMENT — COLUMBIA-SUICIDE SEVERITY RATING SCALE - C-SSRS
6. HAVE YOU EVER DONE ANYTHING, STARTED TO DO ANYTHING, OR PREPARED TO DO ANYTHING TO END YOUR LIFE?: NO
2. HAVE YOU ACTUALLY HAD ANY THOUGHTS OF KILLING YOURSELF IN THE PAST MONTH?: NO
1. IN THE PAST MONTH, HAVE YOU WISHED YOU WERE DEAD OR WISHED YOU COULD GO TO SLEEP AND NOT WAKE UP?: NO

## 2024-12-14 VITALS
SYSTOLIC BLOOD PRESSURE: 140 MMHG | TEMPERATURE: 98.4 F | DIASTOLIC BLOOD PRESSURE: 91 MMHG | HEART RATE: 77 BPM | RESPIRATION RATE: 16 BRPM | OXYGEN SATURATION: 93 %

## 2024-12-14 LAB — S PYO DNA THROAT QL NAA+PROBE: NOT DETECTED

## 2024-12-14 PROCEDURE — 87651 STREP A DNA AMP PROBE: CPT | Performed by: STUDENT IN AN ORGANIZED HEALTH CARE EDUCATION/TRAINING PROGRAM

## 2024-12-14 NOTE — ED TRIAGE NOTES
Triage Assessment (Adult)       Row Name 12/13/24 2112          Triage Assessment    Airway WDL WDL        Respiratory WDL    Respiratory WDL X;all     Rhythm/Pattern, Respiratory shortness of breath;unlabored;pattern regular     Expansion/Accessory Muscles/Retractions no use of accessory muscles;no retractions;expansion symmetric     Nailbeds no discoloration     Mucous Membranes moist;intact     Cough Frequency frequent        Skin Circulation/Temperature WDL    Skin Circulation/Temperature WDL WDL        Cardiac WDL    Cardiac WDL WDL        Peripheral/Neurovascular WDL    Peripheral Neurovascular WDL WDL        Cognitive/Neuro/Behavioral WDL    Cognitive/Neuro/Behavioral WDL WDL

## 2024-12-14 NOTE — DISCHARGE INSTRUCTIONS
You are seen in the emergency department due to headache associated with sinus pain, cough, and back pain as well as urinary frequency.    You were found to have evidence consistent with sinusitis which is a bacterial infection in your sinuses.  This is treated with antibiotics as well as nasal steroid spray, and sinus rinses.  Your x-ray is negative for pneumonia.  Your urinalysis does not show any signs of infection.  Your urinary frequency could be related to enlargement of the prostate which happens with age.  If you continue to have ongoing problems with this we would recommend that you discuss this with your primary care doctor versus a urologist.  There are certain medications that you can get started on that will help with these symptoms.    Your symptoms may last for a few days to 1 week.  You may experience nasal congestion, runny nose, sore throat, cough, fatigue, nausea, vomiting, diarrhea, abdominal cramping.  Try to get plenty of rest, drink lots of fluids, and plenty of fluids with electrolytes if you are having a difficult time eating any food.    Typically, this is treated with symptomatic treatment:    - Pain or fever - acetaminophen 500-650 mg every 6-8 hours, ibuprofen 400-600mg every 8 hours   - decongestant - pseudoephedrine/Sudafed 2-3 times daily  - nasal steroid - Fluticasone/Flonase 2 sprays in both nostrils twice daily  - nasal irrigation - nettipot or other nasal irrigation. Using previously boiled or distilled water mixed with saline packet, irrigate one nostril allowing water to drip out the other side into a sink. Do this up to 2 times daily, especially before sleep if nighttime congestion is bothersome    Return to the emergency department with high fevers that you cannot break at home with medications, severe worsening difficulty breathing, inability to swallow your own saliva, severe chest pain, significant nausea and vomiting and inability to keep any fluids down, or any other  severe worsening symptoms

## 2024-12-14 NOTE — ED PROVIDER NOTES
ED Provider Note  [facility]      History     Chief Complaint   Patient presents with    Headache     Pt reports feeling unwell for past three weeks. Having headache, cough and sinus pressure, SOB. Denies fevers. Frequent urination at night and pain in lower back. Chest pain during inhalation.      HPI  Zak Bey is a 54 year old male with a past medical history of diabetes presents emerged department due to 2-1/2 weeks of sinus pain now with ongoing headache, occasional shortness of breath, cough that is occasionally productive, and back pain.    He notes all this began with some facial pain and nasal drainage.  He has been having ongoing pain especially over his forehead and on his face especially the left side.  He has been having nasal drainage and congestion that is green in production.  Occasionally has pain with swallowing as well.  No abdominal pain, nausea or vomiting.  He does endorse pain in his back primarily on his lower back and worse with turning and twisting.  Did endorse some chest pain that is primarily with coughing as well.  Pleuritic or exertional chest pain    Past Medical History  Past Medical History:   Diagnosis Date    Depressive disorder      Past Surgical History:   Procedure Laterality Date    ORTHOPEDIC SURGERY      back surgery     amoxicillin-clavulanate (AUGMENTIN) 875-125 MG tablet  acetaminophen (TYLENOL) 325 MG tablet  azithromycin (ZITHROMAX Z-WAN) 250 MG tablet  cetirizine (ZYRTEC) 10 MG tablet  famotidine (PEPCID) 20 MG tablet  fluticasone (FLONASE) 50 MCG/ACT spray  HYDROcodone-acetaminophen (NORCO) 5-325 MG per tablet  ipratropium (ATROVENT) 0.06 % nasal spray  lidocaine (LIDODERM) 5 % patch  metFORMIN (GLUCOPHAGE) 500 MG tablet  methylPREDNISolone (MEDROL DOSEPAK) 4 MG tablet therapy pack  sodium chloride (NASAL MOIST) 0.65 % nasal spray      No Known Allergies  Family History  No family history on file.  Social History   Social  History     Tobacco Use    Smoking status: Former     Current packs/day: 0.00     Types: Cigarettes     Quit date: 2003     Years since quittin.6    Smokeless tobacco: Never   Substance Use Topics    Alcohol use: Yes     Comment: sometimes    Drug use: No      A medically appropriate review of systems was performed with pertinent positives and negatives noted in the HPI, and all other systems negative.    Physical Exam   BP: (!) 142/91  Pulse: 77  Temp: 98.4  F (36.9  C)  Resp: 16  SpO2: 99 %  Physical Exam  GEN: Well appearing, non toxic, cooperative  HEENT: normocephalic and atraumatic, PERRLA, EOMI, bilateral nasal mucosa inflamed with yellow/green mucus noted, left facial maxillary tenderness and bilateral frontal tenderness, mild bilateral cervical lymphadenopathy that is tender  CV: well-perfused, normal skin color for ethnicity, regular rate and rhythm, no murmurs rubs or gallops  PULM: breathing comfortably, in no respiratory distress, clear to auscultation up in the upper and lower lung fields bilaterally  ABD: nondistended soft, nontender, negative Belcher, negative Roshan point tenderness, no suprapubic tenderness  Back: No CVA tenderness, mild bilateral paraspinal lumbar tenderness  EXT: Full range of motion.  No edema.  NEURO: awake, conversant, grossly normal bilateral upper and lower extremity strength & ROM   SKIN: No rashes, ecchymosis, or lacerations  PSYCH: Calm and cooperative, interactive     ED Course, Procedures, & Data      Procedures            EKG Interpretation:      Interpreted by Michelle Mckeon MD  Time reviewed: 0000  Symptoms at time of EKG: chest pain   Rhythm: normal sinus   Rate: normal  Axis: normal  Ectopy: none  Conduction: normal  ST Segments/ T Waves: No ST-T wave changes  Q Waves: none  Comparison to prior: Unchanged    Clinical Impression: normal EKG     Results for orders placed or performed during the hospital encounter of 24   XR Chest 2 Views     Status: None     Narrative    EXAM: XR CHEST 2 VIEWS  LOCATION: [facility]  DATE: 12/13/2024    INDICATION: chest pain  COMPARISON: 12/9/2018      Impression    IMPRESSION: Negative chest.   Influenza A/B, RSV and SARS-CoV2 PCR (COVID-19) Nose     Status: Normal    Specimen: Nose; Swab   Result Value Ref Range    Influenza A PCR Negative Negative    Influenza B PCR Negative Negative    RSV PCR Negative Negative    SARS CoV2 PCR Negative Negative    Narrative    Testing was performed using the Xpert Xpress CoV2/Flu/RSV Assay on the Cepheid GeneXpert Instrument. This test should be ordered for the detection of SARS-CoV2, influenza, and RSV viruses in individuals with signs and symptoms of respiratory tract infection. This test is for in vitro diagnostic use under the [place] FDA for laboratories certified under CLIA to perform high or moderate complexity testing. This test has been [place] FDA cleared. A negative result does not rule out the presence of PCR inhibitors in the specimen or target RNA in concentration below the limit of detection for the assay. If only one viral target is positive but coinfection with multiple targets is suspected, the sample should be re-tested with another FDA cleared, approved, or authorized test, if coninfection would change clinical management. This test was validated by the [facility]. These laboratories are certified under the Clinical Laboratory Improvement Amendments of 1988 (CLIA-88) as qualified to perfom high complexity laboratory testing.   UA with Microscopic reflex to Culture     Status: Abnormal    Specimen: Urine, Midstream   Result Value Ref Range    Color Urine Light Yellow Colorless, Straw, Light Yellow, Yellow    Appearance Urine Clear Clear    Glucose Urine Negative Negative mg/dL    Bilirubin Urine Negative Negative    Ketones Urine Negative Negative mg/dL    Specific Gravity Urine 1.034 1.003 - 1.035    Blood Urine Negative  Negative    pH Urine 6.5 5.0 - 7.0    Protein Albumin Urine 10 (A) Negative mg/dL    Urobilinogen Urine Normal Normal, 2.0 mg/dL    Nitrite Urine Negative Negative    Leukocyte Esterase Urine Negative Negative    Mucus Urine Present (A) None Seen /LPF    RBC Urine 1 <=2 /HPF    WBC Urine 1 <=5 /HPF    Narrative    Urine Culture not indicated   Comprehensive metabolic panel     Status: Abnormal   Result Value Ref Range    Sodium 140 135 - 145 mmol/L    Potassium 4.5 3.4 - 5.3 mmol/L    Carbon Dioxide (CO2) 27 22 - 29 mmol/L    Anion Gap 11 7 - 15 mmol/L    Urea Nitrogen 20.0 6.0 - 20.0 mg/dL    Creatinine 0.83 0.67 - 1.17 mg/dL    GFR Estimate >90 >60 mL/min/1.73m2    Calcium 9.4 8.8 - 10.4 mg/dL    Chloride 102 98 - 107 mmol/L    Glucose 106 (H) 70 - 99 mg/dL    Alkaline Phosphatase 102 40 - 150 U/L    AST 21 0 - 45 U/L    ALT 37 0 - 70 U/L    Protein Total 7.1 6.4 - 8.3 g/dL    Albumin 4.4 3.5 - 5.2 g/dL    Bilirubin Total 0.3 <=1.2 mg/dL   Lipase     Status: Normal   Result Value Ref Range    Lipase 43 13 - 60 U/L   Troponin T, High Sensitivity     Status: Normal   Result Value Ref Range    Troponin T, High Sensitivity <6 <=22 ng/L   CBC with platelets and differential     Status: None   Result Value Ref Range    WBC Count 10.0 4.0 - 11.0 10e3/uL    RBC Count 4.72 4.40 - 5.90 10e6/uL    Hemoglobin 15.1 13.3 - 17.7 g/dL    Hematocrit 42.3 40.0 - 53.0 %    MCV 90 78 - 100 fL    MCH 32.0 26.5 - 33.0 pg    MCHC 35.7 31.5 - 36.5 g/dL    RDW 11.9 10.0 - 15.0 %    Platelet Count 218 150 - 450 10e3/uL    % Neutrophils 60 %    % Lymphocytes 26 %    % Monocytes 9 %    % Eosinophils 5 %    % Basophils 1 %    % Immature Granulocytes 0 %    NRBCs per 100 WBC 0 <1 /100    Absolute Neutrophils 6.0 1.6 - 8.3 10e3/uL    Absolute Lymphocytes 2.6 0.8 - 5.3 10e3/uL    Absolute Monocytes 0.9 0.0 - 1.3 10e3/uL    Absolute Eosinophils 0.5 0.0 - 0.7 10e3/uL    Absolute Basophils 0.1 0.0 - 0.2 10e3/uL    Absolute Immature Granulocytes 0.0  <=0.4 10e3/uL    Absolute NRBCs 0.0 10e3/uL   CBC with platelets differential     Status: None    Narrative    The following orders were created for panel order CBC with platelets differential.  Procedure                               Abnormality         Status                     ---------                               -----------         ------                     CBC with platelets and d...[[id number]]                      Final result                 Please view results for these tests on the individual orders.     Medications   acetaminophen (TYLENOL) tablet 975 mg (975 mg Oral $Given 12/13/24 2963)     Labs Ordered and Resulted from Time of ED Arrival to Time of ED Departure   ROUTINE UA WITH MICROSCOPIC REFLEX TO CULTURE - Abnormal       Result Value    Color Urine Light Yellow      Appearance Urine Clear      Glucose Urine Negative      Bilirubin Urine Negative      Ketones Urine Negative      Specific Gravity Urine 1.034      Blood Urine Negative      pH Urine 6.5      Protein Albumin Urine 10 (*)     Urobilinogen Urine Normal      Nitrite Urine Negative      Leukocyte Esterase Urine Negative      Mucus Urine Present (*)     RBC Urine 1      WBC Urine 1     COMPREHENSIVE METABOLIC PANEL - Abnormal    Sodium 140      Potassium 4.5      Carbon Dioxide (CO2) 27      Anion Gap 11      Urea Nitrogen 20.0      Creatinine 0.83      GFR Estimate >90      Calcium 9.4      Chloride 102      Glucose 106 (*)     Alkaline Phosphatase 102      AST 21      ALT 37      Protein Total 7.1      Albumin 4.4      Bilirubin Total 0.3     INFLUENZA A/B, RSV AND SARS-COV2 PCR - Normal    Influenza A PCR Negative      Influenza B PCR Negative      RSV PCR Negative      SARS CoV2 PCR Negative     LIPASE - Normal    Lipase 43     TROPONIN T, HIGH SENSITIVITY - Normal    Troponin T, High Sensitivity <6     CBC WITH PLATELETS AND DIFFERENTIAL    WBC Count 10.0      RBC Count 4.72      Hemoglobin 15.1      Hematocrit 42.3      MCV 90       MCH 32.0      MCHC 35.7      RDW 11.9      Platelet Count 218      % Neutrophils 60      % Lymphocytes 26      % Monocytes 9      % Eosinophils 5      % Basophils 1      % Immature Granulocytes 0      NRBCs per 100 WBC 0      Absolute Neutrophils 6.0      Absolute Lymphocytes 2.6      Absolute Monocytes 0.9      Absolute Eosinophils 0.5      Absolute Basophils 0.1      Absolute Immature Granulocytes 0.0      Absolute NRBCs 0.0     GROUP A STREPTOCOCCUS PCR THROAT SWAB     XR Chest 2 Views   Final Result   IMPRESSION: Negative chest.             Critical care was not performed.     Medical Decision Making  The patient's presentation was of moderate complexity (an acute illness with systemic symptoms).    The patient's evaluation involved:  review of external note(s) from 3+ sources (see separate area of note for details)  review of 3+ test result(s) ordered prior to this encounter (see separate area of note for details)  ordering and/or review of 3+ test(s) in this encounter (see separate area of note for details)    The patient's management necessitated moderate risk (prescription drug management including medications given in the ED).    Assessment & Plan    54-year-old male with a past medical history of diabetes presenting to the emergency department with 2-1/2 weeks of nasal congestion, facial pain in the frontal sinuses and forehead as well as now increasing occasionally productive cough, some dyspnea and bilateral back pain as well as urinary frequency.    X-ray here without any evidence of pneumonia.  EKG normal and troponin negative.  Low suspicion of ACS.  Does have some reproducible pain and pain around his sternum which could be related to costochondritis from a viral syndrome.  Abdomen soft and nontender with low suspicion of intra-abdominal process.  No significant CVA tenderness and urinalysis is negative for UTI.  Does however have consistent symptoms with sinusitis and with his length of symptoms  we will tend to discharge him with antibiotics.  COVID swab and RSV and flu is negative.  Pending strep at the time of discharge    I have reviewed the nursing notes. I have reviewed the findings, diagnosis, plan and need for follow up with the patient.    New Prescriptions    AMOXICILLIN-CLAVULANATE (AUGMENTIN) 875-125 MG TABLET    Take 1 tablet by mouth 2 times daily for 10 days.       Final diagnoses:   Acute non-recurrent maxillary sinusitis   Acute viral syndrome       Michelle Mckeon MD  [facility] EMERGENCY DEPARTMENT  12/13/2024     Michelle Mckeon MD  12/14/24 0016

## 2024-12-19 LAB
ATRIAL RATE - MUSE: 61 BPM
DIASTOLIC BLOOD PRESSURE - MUSE: NORMAL MMHG
INTERPRETATION ECG - MUSE: NORMAL
P AXIS - MUSE: 41 DEGREES
PR INTERVAL - MUSE: 182 MS
QRS DURATION - MUSE: 94 MS
QT - MUSE: 422 MS
QTC - MUSE: 424 MS
R AXIS - MUSE: 28 DEGREES
SYSTOLIC BLOOD PRESSURE - MUSE: NORMAL MMHG
T AXIS - MUSE: 35 DEGREES
VENTRICULAR RATE- MUSE: 61 BPM

## 2025-05-18 ENCOUNTER — HOSPITAL ENCOUNTER (EMERGENCY)
Facility: CLINIC | Age: 55
Discharge: HOME | End: 2025-05-18
Attending: STUDENT IN AN ORGANIZED HEALTH CARE EDUCATION/TRAINING PROGRAM | Admitting: STUDENT IN AN ORGANIZED HEALTH CARE EDUCATION/TRAINING PROGRAM
Payer: MEDICAID

## 2025-05-18 ENCOUNTER — APPOINTMENT (OUTPATIENT)
Dept: INTERPRETER SERVICES | Facility: CLINIC | Age: 55
End: 2025-05-18
Payer: MEDICAID

## 2025-05-18 VITALS
BODY MASS INDEX: 31.42 KG/M2 | TEMPERATURE: 98.2 F | OXYGEN SATURATION: 98 % | HEART RATE: 64 BPM | SYSTOLIC BLOOD PRESSURE: 167 MMHG | RESPIRATION RATE: 16 BRPM | HEIGHT: 68 IN | WEIGHT: 207.3 LBS | DIASTOLIC BLOOD PRESSURE: 97 MMHG

## 2025-05-18 DIAGNOSIS — R51.9 ACUTE NONINTRACTABLE HEADACHE, UNSPECIFIED HEADACHE TYPE: ICD-10-CM

## 2025-05-18 DIAGNOSIS — R39.11 URINARY HESITANCY: ICD-10-CM

## 2025-05-18 LAB
ALBUMIN SERPL BCG-MCNC: 4.8 G/DL (ref 3.5–5.2)
ALBUMIN UR-MCNC: NEGATIVE MG/DL
ALP SERPL-CCNC: 107 U/L (ref 40–150)
ALT SERPL W P-5'-P-CCNC: 62 U/L (ref 0–70)
ANION GAP SERPL CALCULATED.3IONS-SCNC: 12 MMOL/L (ref 7–15)
APPEARANCE UR: CLEAR
AST SERPL W P-5'-P-CCNC: 40 U/L (ref 0–45)
BASOPHILS # BLD AUTO: 0.1 10E3/UL (ref 0–0.2)
BASOPHILS NFR BLD AUTO: 1 %
BILIRUB SERPL-MCNC: 0.4 MG/DL
BILIRUB UR QL STRIP: NEGATIVE
BUN SERPL-MCNC: 11.9 MG/DL (ref 6–20)
CALCIUM SERPL-MCNC: 9.6 MG/DL (ref 8.8–10.4)
CHLORIDE SERPL-SCNC: 103 MMOL/L (ref 98–107)
COLOR UR AUTO: ABNORMAL
CREAT SERPL-MCNC: 0.78 MG/DL (ref 0.67–1.17)
EGFRCR SERPLBLD CKD-EPI 2021: >90 ML/MIN/1.73M2
EOSINOPHIL # BLD AUTO: 0.4 10E3/UL (ref 0–0.7)
EOSINOPHIL NFR BLD AUTO: 5 %
ERYTHROCYTE [DISTWIDTH] IN BLOOD BY AUTOMATED COUNT: 12.4 % (ref 10–15)
FLUAV RNA SPEC QL NAA+PROBE: NEGATIVE
FLUBV RNA RESP QL NAA+PROBE: NEGATIVE
GLUCOSE BLDC GLUCOMTR-MCNC: 115 MG/DL (ref 70–99)
GLUCOSE SERPL-MCNC: 110 MG/DL (ref 70–99)
GLUCOSE UR STRIP-MCNC: NEGATIVE MG/DL
HCO3 SERPL-SCNC: 25 MMOL/L (ref 22–29)
HCT VFR BLD AUTO: 46.8 % (ref 40–53)
HGB BLD-MCNC: 16.6 G/DL (ref 13.3–17.7)
HGB UR QL STRIP: NEGATIVE
IMM GRANULOCYTES # BLD: 0 10E3/UL
IMM GRANULOCYTES NFR BLD: 0 %
KETONES UR STRIP-MCNC: NEGATIVE MG/DL
LEUKOCYTE ESTERASE UR QL STRIP: NEGATIVE
LIPASE SERPL-CCNC: 42 U/L (ref 13–60)
LYMPHOCYTES # BLD AUTO: 2.1 10E3/UL (ref 0.8–5.3)
LYMPHOCYTES NFR BLD AUTO: 31 %
MCH RBC QN AUTO: 31.5 PG (ref 26.5–33)
MCHC RBC AUTO-ENTMCNC: 35.5 G/DL (ref 31.5–36.5)
MCV RBC AUTO: 89 FL (ref 78–100)
MONOCYTES # BLD AUTO: 0.5 10E3/UL (ref 0–1.3)
MONOCYTES NFR BLD AUTO: 7 %
MUCOUS THREADS #/AREA URNS LPF: PRESENT /LPF
NEUTROPHILS # BLD AUTO: 3.9 10E3/UL (ref 1.6–8.3)
NEUTROPHILS NFR BLD AUTO: 56 %
NITRATE UR QL: NEGATIVE
NRBC # BLD AUTO: 0 10E3/UL
NRBC BLD AUTO-RTO: 0 /100
PH UR STRIP: 6 [PH] (ref 5–7)
PLATELET # BLD AUTO: 243 10E3/UL (ref 150–450)
POTASSIUM SERPL-SCNC: 4.3 MMOL/L (ref 3.4–5.3)
PROT SERPL-MCNC: 7.9 G/DL (ref 6.4–8.3)
RBC # BLD AUTO: 5.27 10E6/UL (ref 4.4–5.9)
RBC URINE: <1 /HPF
RSV RNA SPEC NAA+PROBE: NEGATIVE
SARS-COV-2 RNA RESP QL NAA+PROBE: NEGATIVE
SODIUM SERPL-SCNC: 140 MMOL/L (ref 135–145)
SP GR UR STRIP: 1.02 (ref 1–1.03)
UROBILINOGEN UR STRIP-MCNC: NORMAL MG/DL
WBC # BLD AUTO: 7 10E3/UL (ref 4–11)
WBC URINE: <1 /HPF

## 2025-05-18 PROCEDURE — 85004 AUTOMATED DIFF WBC COUNT: CPT | Performed by: STUDENT IN AN ORGANIZED HEALTH CARE EDUCATION/TRAINING PROGRAM

## 2025-05-18 PROCEDURE — 99284 EMERGENCY DEPT VISIT MOD MDM: CPT | Performed by: STUDENT IN AN ORGANIZED HEALTH CARE EDUCATION/TRAINING PROGRAM

## 2025-05-18 PROCEDURE — 99283 EMERGENCY DEPT VISIT LOW MDM: CPT | Performed by: STUDENT IN AN ORGANIZED HEALTH CARE EDUCATION/TRAINING PROGRAM

## 2025-05-18 PROCEDURE — 250N000013 HC RX MED GY IP 250 OP 250 PS 637: Performed by: STUDENT IN AN ORGANIZED HEALTH CARE EDUCATION/TRAINING PROGRAM

## 2025-05-18 PROCEDURE — 83690 ASSAY OF LIPASE: CPT | Performed by: STUDENT IN AN ORGANIZED HEALTH CARE EDUCATION/TRAINING PROGRAM

## 2025-05-18 PROCEDURE — 81001 URINALYSIS AUTO W/SCOPE: CPT | Performed by: STUDENT IN AN ORGANIZED HEALTH CARE EDUCATION/TRAINING PROGRAM

## 2025-05-18 PROCEDURE — 80053 COMPREHEN METABOLIC PANEL: CPT | Performed by: STUDENT IN AN ORGANIZED HEALTH CARE EDUCATION/TRAINING PROGRAM

## 2025-05-18 PROCEDURE — 82962 GLUCOSE BLOOD TEST: CPT

## 2025-05-18 PROCEDURE — 87637 SARSCOV2&INF A&B&RSV AMP PRB: CPT | Performed by: STUDENT IN AN ORGANIZED HEALTH CARE EDUCATION/TRAINING PROGRAM

## 2025-05-18 PROCEDURE — 36415 COLL VENOUS BLD VENIPUNCTURE: CPT | Performed by: STUDENT IN AN ORGANIZED HEALTH CARE EDUCATION/TRAINING PROGRAM

## 2025-05-18 RX ORDER — TAMSULOSIN HYDROCHLORIDE 0.4 MG/1
0.4 CAPSULE ORAL DAILY
Qty: 60 CAPSULE | Refills: 0 | Status: SHIPPED | OUTPATIENT
Start: 2025-05-18 | End: 2025-07-17

## 2025-05-18 RX ORDER — ACETAMINOPHEN 325 MG/1
975 TABLET ORAL ONCE
Status: COMPLETED | OUTPATIENT
Start: 2025-05-18 | End: 2025-05-18

## 2025-05-18 RX ORDER — IBUPROFEN 600 MG/1
600 TABLET, FILM COATED ORAL ONCE
Status: COMPLETED | OUTPATIENT
Start: 2025-05-18 | End: 2025-05-18

## 2025-05-18 RX ADMIN — ACETAMINOPHEN 975 MG: 325 TABLET ORAL at 12:33

## 2025-05-18 RX ADMIN — IBUPROFEN 600 MG: 600 TABLET ORAL at 12:33

## 2025-05-18 ASSESSMENT — ACTIVITIES OF DAILY LIVING (ADL): ADLS_ACUITY_SCORE: 41

## 2025-05-18 NOTE — ED PROVIDER NOTES
ED Provider Note  [facility]      History     Chief Complaint   Patient presents with    Flank Pain    Abdominal Pain    Headache     Pt complained of both a headache and abdominal pain. Pt specified he had abdominal pain that radiated to his back. Pt stated that he was urinating frequently. Pt stated that he was urinating large amounts. Pt thought his fluid intake was not increasing too much.      HPI  Zak Bey is a 55 year old [place]amian-speaking male who presents to the emergency department with headache left back and flank pain, and urinary frequency.    Patient reports that this has been going on for the last year.  He has been difficulties where he urinates often sometimes awake at night 4-5 times at a time, and urinating multiple times throughout the day.  It used to come in spurts where he would get it for a number of days and then it would get better for a number of days but more recently it has become a daily occurrence.  He reports last night he was up 6 times to urinate, and today is having headache and low back pain that he states he believes are from not being able to sleep.  His headache is across his whole head, and he is not taking any medications for this.  His pain in his left side of his back is pain that he has had previously due to previous surgeries.  Denies any dysuria or pain in his abdomen.  No loss of control of his bowels or bladder, no numbness or weakness of his legs or his groin.    Past Medical History  Past Medical History:   Diagnosis Date    Depressive disorder      Past Surgical History:   Procedure Laterality Date    ORTHOPEDIC SURGERY      back surgery     tamsulosin (FLOMAX) 0.4 MG capsule  acetaminophen (TYLENOL) 325 MG tablet  amoxicillin-clavulanate (AUGMENTIN) 875-125 MG tablet  azithromycin (ZITHROMAX Z-WAN) 250 MG tablet  cetirizine (ZYRTEC) 10 MG tablet  famotidine (PEPCID) 20 MG tablet  fluticasone (FLONASE) 50 MCG/ACT  "spray  HYDROcodone-acetaminophen (NORCO) 5-325 MG per tablet  ipratropium (ATROVENT) 0.06 % nasal spray  lidocaine (LIDODERM) 5 % patch  metFORMIN (GLUCOPHAGE) 500 MG tablet  methylPREDNISolone (MEDROL DOSEPAK) 4 MG tablet therapy pack  sodium chloride (NASAL MOIST) 0.65 % nasal spray      No Known Allergies  Family History  No family history on file.  Social History   Social History     Tobacco Use    Smoking status: Former     Current packs/day: 0.00     Types: Cigarettes     Quit date: 2003     Years since quittin.0    Smokeless tobacco: Never   Substance Use Topics    Alcohol use: Yes     Comment: sometimes    Drug use: No      A medically appropriate review of systems was performed with pertinent positives and negatives noted in the HPI, and all other systems negative.    Physical Exam   BP: (!) 158/99  Pulse: 64  Temp: 98.2  F (36.8  C)  Resp: 16  Height: 172.7 cm (5' 8\")  Weight: 94 kg (207 lb 4.8 oz)  SpO2: 97 %  Physical Exam  GEN: Well appearing, non toxic, cooperative  HEENT: normocephalic and atraumatic, PERRLA, EOMI  CV: well-perfused, normal skin color for ethnicity, regular rate and rhythm, no murmurs rubs or gallops  PULM: breathing comfortably, in no respiratory distress, clear to auscultation upper and lower lung fields  ABD: nondistended, soft, nontender, nonperitonitic  Back no thoracic tenderness, lumbar tenderness.  No paraspinal tenderness.,  No CVA tenderness no midline cervical tenderness:  EXT: Full range of motion.  No edema.  NEURO: awake, conversant, grossly normal bilateral upper and lower extremity strength & ROM   SKIN: No rashes, ecchymosis, or lacerations  PSYCH: Calm and cooperative, interactive      ED Course, Procedures, & Data      Procedures        Results for orders placed or performed during the hospital encounter of 25   Comprehensive metabolic panel     Status: Abnormal   Result Value Ref Range    Sodium 140 135 - 145 mmol/L    Potassium 4.3 3.4 - 5.3 mmol/L "    Carbon Dioxide (CO2) 25 22 - 29 mmol/L    Anion Gap 12 7 - 15 mmol/L    Urea Nitrogen 11.9 6.0 - 20.0 mg/dL    Creatinine 0.78 0.67 - 1.17 mg/dL    GFR Estimate >90 >60 mL/min/1.73m2    Calcium 9.6 8.8 - 10.4 mg/dL    Chloride 103 98 - 107 mmol/L    Glucose 110 (H) 70 - 99 mg/dL    Alkaline Phosphatase 107 40 - 150 U/L    AST 40 0 - 45 U/L    ALT 62 0 - 70 U/L    Protein Total 7.9 6.4 - 8.3 g/dL    Albumin 4.8 3.5 - 5.2 g/dL    Bilirubin Total 0.4 <=1.2 mg/dL   Influenza A/B, RSV and SARS-CoV2 PCR (COVID-19) Nasopharyngeal     Status: Normal    Specimen: Nasopharyngeal; Swab   Result Value Ref Range    Influenza A PCR Negative Negative    Influenza B PCR Negative Negative    RSV PCR Negative Negative    SARS CoV2 PCR Negative Negative    Narrative    Testing was performed using the Xpert Xpress CoV2/Flu/RSV Assay on the Cepheid GeneXpert Instrument. This test should be ordered for the detection of SARS-CoV2, influenza, and RSV viruses in individuals with signs and symptoms of respiratory tract infection. This test is for in vitro diagnostic use under the [place] FDA for laboratories certified under CLIA to perform high or moderate complexity testing. This test has been [place] FDA cleared. A negative result does not rule out the presence of PCR inhibitors in the specimen or target RNA in concentration below the limit of detection for the assay. If only one viral target is positive but coinfection with multiple targets is suspected, the sample should be re-tested with another FDA cleared, approved, or authorized test, if coninfection would change clinical management. This test was validated by the [facility] [email or web address]. These laboratories are certified under the Clinical Laboratory Improvement Amendments of 1988 (CLIA-88) as qualified to perfom high complexity laboratory testing.   UA with Microscopic reflex to Culture     Status: Abnormal    Specimen: Urine, NOS   Result Value Ref Range    Color Urine Light Yellow  Colorless, Straw, Light Yellow, Yellow    Appearance Urine Clear Clear    Glucose Urine Negative Negative mg/dL    Bilirubin Urine Negative Negative    Ketones Urine Negative Negative mg/dL    Specific Gravity Urine 1.019 1.003 - 1.035    Blood Urine Negative Negative    pH Urine 6.0 5.0 - 7.0    Protein Albumin Urine Negative Negative mg/dL    Urobilinogen Urine Normal Normal mg/dL    Nitrite Urine Negative Negative    Leukocyte Esterase Urine Negative Negative    Mucus Urine Present (A) None Seen /LPF    RBC Urine <1 <=2 /HPF    WBC Urine <1 <=5 /HPF    Narrative    Urine Culture not indicated   Lipase     Status: Normal   Result Value Ref Range    Lipase 42 13 - 60 U/L   CBC with platelets and differential     Status: None   Result Value Ref Range    WBC Count 7.0 4.0 - 11.0 10e3/uL    RBC Count 5.27 4.40 - 5.90 10e6/uL    Hemoglobin 16.6 13.3 - 17.7 g/dL    Hematocrit 46.8 40.0 - 53.0 %    MCV 89 78 - 100 fL    MCH 31.5 26.5 - 33.0 pg    MCHC 35.5 31.5 - 36.5 g/dL    RDW 12.4 10.0 - 15.0 %    Platelet Count 243 150 - 450 10e3/uL    % Neutrophils 56 %    % Lymphocytes 31 %    % Monocytes 7 %    % Eosinophils 5 %    % Basophils 1 %    % Immature Granulocytes 0 %    NRBCs per 100 WBC 0 <1 /100    Absolute Neutrophils 3.9 1.6 - 8.3 10e3/uL    Absolute Lymphocytes 2.1 0.8 - 5.3 10e3/uL    Absolute Monocytes 0.5 0.0 - 1.3 10e3/uL    Absolute Eosinophils 0.4 0.0 - 0.7 10e3/uL    Absolute Basophils 0.1 0.0 - 0.2 10e3/uL    Absolute Immature Granulocytes 0.0 <=0.4 10e3/uL    Absolute NRBCs 0.0 10e3/uL   Glucose by meter     Status: Abnormal   Result Value Ref Range    GLUCOSE BY METER POCT 115 (H) 70 - 99 mg/dL   CBC with platelets differential     Status: None    Narrative    The following orders were created for panel order CBC with platelets differential.  Procedure                               Abnormality         Status                     ---------                               -----------         ------                      CBC with platelets and ...[[id number]]                      Final result                 Please view results for these tests on the individual orders.     Medications   acetaminophen (TYLENOL) tablet 975 mg (975 mg Oral $Given 5/18/25 1233)   ibuprofen (ADVIL/MOTRIN) tablet 600 mg (600 mg Oral $Given 5/18/25 1233)     Labs Ordered and Resulted from Time of ED Arrival to Time of ED Departure   COMPREHENSIVE METABOLIC PANEL - Abnormal       Result Value    Sodium 140      Potassium 4.3      Carbon Dioxide (CO2) 25      Anion Gap 12      Urea Nitrogen 11.9      Creatinine 0.78      GFR Estimate >90      Calcium 9.6      Chloride 103      Glucose 110 (*)     Alkaline Phosphatase 107      AST 40      ALT 62      Protein Total 7.9      Albumin 4.8      Bilirubin Total 0.4     ROUTINE UA WITH MICROSCOPIC REFLEX TO CULTURE - Abnormal    Color Urine Light Yellow      Appearance Urine Clear      Glucose Urine Negative      Bilirubin Urine Negative      Ketones Urine Negative      Specific Gravity Urine 1.019      Blood Urine Negative      pH Urine 6.0      Protein Albumin Urine Negative      Urobilinogen Urine Normal      Nitrite Urine Negative      Leukocyte Esterase Urine Negative      Mucus Urine Present (*)     RBC Urine <1      WBC Urine <1     GLUCOSE BY METER - Abnormal    GLUCOSE BY METER POCT 115 (*)    INFLUENZA A/B, RSV AND SARS-COV2 PCR - Normal    Influenza A PCR Negative      Influenza B PCR Negative      RSV PCR Negative      SARS CoV2 PCR Negative     LIPASE - Normal    Lipase 42     GLUCOSE MONITOR NURSING POCT   CBC WITH PLATELETS AND DIFFERENTIAL    WBC Count 7.0      RBC Count 5.27      Hemoglobin 16.6      Hematocrit 46.8      MCV 89      MCH 31.5      MCHC 35.5      RDW 12.4      Platelet Count 243      % Neutrophils 56      % Lymphocytes 31      % Monocytes 7      % Eosinophils 5      % Basophils 1      % Immature Granulocytes 0      NRBCs per 100 WBC 0      Absolute Neutrophils 3.9       Absolute Lymphocytes 2.1      Absolute Monocytes 0.5      Absolute Eosinophils 0.4      Absolute Basophils 0.1      Absolute Immature Granulocytes 0.0      Absolute NRBCs 0.0       No orders to display          Critical care was not performed.     Medical Decision Making  The patient's presentation was of moderate complexity (a chronic illness mild to moderate exacerbation, progression, or side effect of treatment).    The patient's evaluation involved:  review of external note(s) from 3+ sources (see separate area of note for details)  review of 3+ test result(s) ordered prior to this encounter (see separate area of note for details)  ordering and/or review of 3+ test(s) in this encounter (see separate area of note for details)    The patient's management necessitated moderate risk (prescription drug management including medications given in the ED).    Assessment & Plan    55-year-old male with a past medical history of diabetes, PTSD, gastritis, chronic back pain, depression presenting to the emergency department today due to acute on chronic low back pain and urinary frequency noted to be otherwise hemodynamically stable in no acute distress, with no abdominal tenderness, no focal tenderness to his back, no red flag symptoms regarding his back pain.    His urine does not demonstrate any significant evidence of infection or dumping of glucose.  He does have a little bit of mucus in his urine, but is otherwise unremarkable.  His kidney function is normal and his glucose is slightly elevated but not anything that I would be worried about in the setting of him having eaten just prior to coming in.    He is not having any pain with urination, and no evidence of sterile pyuria, low suspicion of prostatitis, however did have concerns as he is previously been told that he had BPH, and was only on medications for about a week before he stopped them as he was not sure if they were helping.    I do believe that this is  more consistent with BPH, will give him a prescription for tamsulosin and talked about how he should be on these for a number of weeks to months before deciding whether or not they are working.  I encouraged him to follow-up with his primary care doctor for evaluation for other possible causes of his symptoms, and he is aware of return precautions.    I have reviewed the nursing notes. I have reviewed the findings, diagnosis, plan and need for follow up with the patient.    New Prescriptions    TAMSULOSIN (FLOMAX) 0.4 MG CAPSULE    Take 1 capsule (0.4 mg) by mouth daily.       Final diagnoses:   Acute nonintractable headache, unspecified headache type   Urinary hesitancy       Michelle Mckeon MD  [facility] EMERGENCY DEPARTMENT  5/18/2025     Michelle Mckeon MD  05/18/25 1243

## 2025-05-18 NOTE — ED TRIAGE NOTES
Pt came in initially complaining of migraine pain and stomach pain. Writer utilized translation iPad to get specifics. Pt then stated that abdominal pain radiated to lower back. Pt stated that he was urinating with greater frequency. Writer asked if he was urinating in greater amounts. Pt stated that he was. Pt stated that was as not drinking much more in terms of fluids amount. Pt stated that he did not notice blood in his urine. Provider updated.

## 2025-05-18 NOTE — DISCHARGE INSTRUCTIONS
Fue atendido en urgencias por dificultades con la frecuencia urinaria.  Tiene niveles de azúcar en avril normales y no hay evidencia de infección del tracto urinario.  Jaki síntomas pueden estar relacionados con tyrone próstata christine.  Paul Smiths es algo muy común que puede suceder en los hombres a medida que envejecen.  Existen medicamentos que pueden ayudar con el agrandamiento de la próstata y le recomendamos que los tome latisha varias semanas hasta 2 meses antes de decidir si van a funcionar o no.  Es importante que jennie un seguimiento con patel médico de atención primaria para un control continuo y para darien si esto ayuda o no con jaki síntomas.    Mientras tanto, regrese al departamento de emergencias si cualquier síntoma grave empeora, incluida la incapacidad para controlar la vejiga, nuevo entumecimiento o debilidad de los brazos o las piernas, entumecimiento del pene o cualquier otro síntoma preocupante.    You were seen in the emergency department due to difficulties with urinary frequency.  You have normal blood sugars here and no evidence of any urinary tract infection.  Your symptoms can be related to a large prostate.  This is a very common thing that can happen in men as they age.  There are medications that can help with the enlargement of the prostate, and we would recommend that you be on them for a number of weeks to up to 2 months before deciding whether or not they are going to work.  It is important that you follow-up with your primary care doctor for ongoing management and to see whether or not this helps with your symptoms.    Return to the emergency department with any worsening severe symptoms in the meantime including inability to control your bladder, new numbness or weakness of your arms or legs, numbness of your penis, or any other concerning symptoms

## 2025-06-22 ENCOUNTER — HOSPITAL ENCOUNTER (EMERGENCY)
Facility: CLINIC | Age: 55
Discharge: HOME | End: 2025-06-22
Attending: EMERGENCY MEDICINE | Admitting: EMERGENCY MEDICINE
Payer: MEDICAID

## 2025-06-22 ENCOUNTER — APPOINTMENT (OUTPATIENT)
Dept: ULTRASOUND IMAGING | Facility: CLINIC | Age: 55
End: 2025-06-22
Attending: EMERGENCY MEDICINE
Payer: MEDICAID

## 2025-06-22 VITALS
RESPIRATION RATE: 16 BRPM | DIASTOLIC BLOOD PRESSURE: 101 MMHG | BODY MASS INDEX: 31.43 KG/M2 | WEIGHT: 206.7 LBS | SYSTOLIC BLOOD PRESSURE: 154 MMHG | OXYGEN SATURATION: 97 % | HEART RATE: 74 BPM | TEMPERATURE: 97.6 F

## 2025-06-22 DIAGNOSIS — M54.42 ACUTE LEFT-SIDED LOW BACK PAIN WITH LEFT-SIDED SCIATICA: ICD-10-CM

## 2025-06-22 DIAGNOSIS — G44.209 TENSION-TYPE HEADACHE, NOT INTRACTABLE, UNSPECIFIED CHRONICITY PATTERN: ICD-10-CM

## 2025-06-22 LAB
ALBUMIN SERPL BCG-MCNC: 4.5 G/DL (ref 3.5–5.2)
ALP SERPL-CCNC: 98 U/L (ref 40–150)
ALT SERPL W P-5'-P-CCNC: 53 U/L (ref 0–70)
ANION GAP SERPL CALCULATED.3IONS-SCNC: 15 MMOL/L (ref 7–15)
AST SERPL W P-5'-P-CCNC: 29 U/L (ref 0–45)
BASOPHILS # BLD AUTO: 0.1 10E3/UL (ref 0–0.2)
BASOPHILS NFR BLD AUTO: 1 %
BILIRUB SERPL-MCNC: 0.5 MG/DL
BUN SERPL-MCNC: 9.6 MG/DL (ref 6–20)
CALCIUM SERPL-MCNC: 9.1 MG/DL (ref 8.8–10.4)
CHLORIDE SERPL-SCNC: 101 MMOL/L (ref 98–107)
CREAT SERPL-MCNC: 0.83 MG/DL (ref 0.67–1.17)
EGFRCR SERPLBLD CKD-EPI 2021: >90 ML/MIN/1.73M2
EOSINOPHIL # BLD AUTO: 0.7 10E3/UL (ref 0–0.7)
EOSINOPHIL NFR BLD AUTO: 9 %
ERYTHROCYTE [DISTWIDTH] IN BLOOD BY AUTOMATED COUNT: 12.4 % (ref 10–15)
GLUCOSE SERPL-MCNC: 101 MG/DL (ref 70–99)
HCO3 SERPL-SCNC: 25 MMOL/L (ref 22–29)
HCT VFR BLD AUTO: 45.5 % (ref 40–53)
HGB BLD-MCNC: 16 G/DL (ref 13.3–17.7)
IMM GRANULOCYTES # BLD: 0 10E3/UL
IMM GRANULOCYTES NFR BLD: 0 %
LYMPHOCYTES # BLD AUTO: 2.1 10E3/UL (ref 0.8–5.3)
LYMPHOCYTES NFR BLD AUTO: 28 %
MAGNESIUM SERPL-MCNC: 2 MG/DL (ref 1.7–2.3)
MCH RBC QN AUTO: 31.9 PG (ref 26.5–33)
MCHC RBC AUTO-ENTMCNC: 35.2 G/DL (ref 31.5–36.5)
MCV RBC AUTO: 91 FL (ref 78–100)
MONOCYTES # BLD AUTO: 0.5 10E3/UL (ref 0–1.3)
MONOCYTES NFR BLD AUTO: 7 %
NEUTROPHILS # BLD AUTO: 4 10E3/UL (ref 1.6–8.3)
NEUTROPHILS NFR BLD AUTO: 55 %
NRBC # BLD AUTO: 0 10E3/UL
NRBC BLD AUTO-RTO: 0 /100
PLATELET # BLD AUTO: 232 10E3/UL (ref 150–450)
POTASSIUM SERPL-SCNC: 4.4 MMOL/L (ref 3.4–5.3)
PROT SERPL-MCNC: 7.2 G/DL (ref 6.4–8.3)
RBC # BLD AUTO: 5.02 10E6/UL (ref 4.4–5.9)
SODIUM SERPL-SCNC: 141 MMOL/L (ref 135–145)
WBC # BLD AUTO: 7.3 10E3/UL (ref 4–11)

## 2025-06-22 PROCEDURE — 83735 ASSAY OF MAGNESIUM: CPT | Performed by: EMERGENCY MEDICINE

## 2025-06-22 PROCEDURE — 250N000013 HC RX MED GY IP 250 OP 250 PS 637: Performed by: EMERGENCY MEDICINE

## 2025-06-22 PROCEDURE — 82310 ASSAY OF CALCIUM: CPT | Performed by: EMERGENCY MEDICINE

## 2025-06-22 PROCEDURE — 85004 AUTOMATED DIFF WBC COUNT: CPT | Performed by: EMERGENCY MEDICINE

## 2025-06-22 PROCEDURE — 36415 COLL VENOUS BLD VENIPUNCTURE: CPT | Performed by: EMERGENCY MEDICINE

## 2025-06-22 PROCEDURE — 99284 EMERGENCY DEPT VISIT MOD MDM: CPT | Performed by: EMERGENCY MEDICINE

## 2025-06-22 PROCEDURE — 99284 EMERGENCY DEPT VISIT MOD MDM: CPT | Mod: 25 | Performed by: EMERGENCY MEDICINE

## 2025-06-22 PROCEDURE — 93971 EXTREMITY STUDY: CPT | Mod: LT

## 2025-06-22 RX ORDER — ACETAMINOPHEN 500 MG
1000 TABLET ORAL ONCE
Status: COMPLETED | OUTPATIENT
Start: 2025-06-22 | End: 2025-06-22

## 2025-06-22 RX ORDER — CYCLOBENZAPRINE HCL 10 MG
10 TABLET ORAL ONCE
Status: COMPLETED | OUTPATIENT
Start: 2025-06-22 | End: 2025-06-22

## 2025-06-22 RX ORDER — CYCLOBENZAPRINE HCL 10 MG
10 TABLET ORAL 3 TIMES DAILY PRN
Qty: 30 TABLET | Refills: 0 | Status: SHIPPED | OUTPATIENT
Start: 2025-06-22 | End: 2025-07-02

## 2025-06-22 RX ORDER — LIDOCAINE 4 G/G
1 PATCH TOPICAL ONCE
Status: DISCONTINUED | OUTPATIENT
Start: 2025-06-22 | End: 2025-06-22 | Stop reason: HOSPADM

## 2025-06-22 RX ORDER — IBUPROFEN 600 MG/1
600 TABLET, FILM COATED ORAL ONCE
Status: COMPLETED | OUTPATIENT
Start: 2025-06-22 | End: 2025-06-22

## 2025-06-22 RX ADMIN — ACETAMINOPHEN 1000 MG: 500 TABLET ORAL at 13:06

## 2025-06-22 RX ADMIN — IBUPROFEN 600 MG: 600 TABLET ORAL at 13:06

## 2025-06-22 RX ADMIN — LIDOCAINE 4% 1 PATCH: 40 PATCH TOPICAL at 13:05

## 2025-06-22 RX ADMIN — CYCLOBENZAPRINE 10 MG: 10 TABLET, FILM COATED ORAL at 15:50

## 2025-06-22 ASSESSMENT — COLUMBIA-SUICIDE SEVERITY RATING SCALE - C-SSRS
2. HAVE YOU ACTUALLY HAD ANY THOUGHTS OF KILLING YOURSELF IN THE PAST MONTH?: NO
6. HAVE YOU EVER DONE ANYTHING, STARTED TO DO ANYTHING, OR PREPARED TO DO ANYTHING TO END YOUR LIFE?: NO
1. IN THE PAST MONTH, HAVE YOU WISHED YOU WERE DEAD OR WISHED YOU COULD GO TO SLEEP AND NOT WAKE UP?: NO

## 2025-06-22 ASSESSMENT — ACTIVITIES OF DAILY LIVING (ADL)
ADLS_ACUITY_SCORE: 41

## 2025-06-22 NOTE — ED TRIAGE NOTES
Left knee pain that started over a week now and is radiating into his lower left abdomen, left side of back and down into his knee. Denies any fever. States he has been feeling more fatigue and having a sore throat for the past few days as well. Denies being around any known sick contacts.

## 2025-06-22 NOTE — DISCHARGE INSTRUCTIONS
As discussed, use supportive care at home with over-the-counter Tylenol and ibuprofen, as directed for pain.  Take ibuprofen with food so it does not irritate your stomach.  In addition, you were given a lidocaine patch in the emergency department.  Keep this on for 12 hours.  Then remove for 12 hours.  You can repeat as needed 12 hours on and 12 hours off.  You can purchase lidocaine 4% patches over-the-counter at any pharmacy and continue to do this.  You can use heat packs, ice pads and slow stretches.  I gave you a muscle relaxer to use if supportive care with Tylenol and ibuprofen does not give adequate relief.  Do not operate heavy machinery or go swimming if this medication makes you tired.    You can continue to follow-up with your spine team up at [facility].  If you decide to switch over to [place], a referral was placed.  Return to the emergency department if you are unable to control your urination or bowel movements, have loss of sensation, have new weakness, or other concerns.

## 2025-06-22 NOTE — ED PROVIDER NOTES
[facility] EMERGENCY DEPARTMENT ([facility])    25      ED PROVIDER NOTE      History     Chief Complaint   Patient presents with    Abdominal Pain    Knee Pain     HPI  Zak Bey is a 55 year old male with a history of DMT2, PTSD, gastritis, chronic back pain, depression, HTN, who presents to the ED with left lower back pain radiating down to his left knee, fatigue and sore throat. Patient reports he had back surgery 3 years ago and has not had back pain since until 1.5 weeks ago. There was no inciting event. His knee pain prevents him from walking or working. The pain is accompanied by a headache, dizziness, fatigue, and a bitter taste in the mornings. Patient denies numbness or tingling, loss of bladder or bowel control, no chest pain, shortness of breath or cough. He has been eating and drinking normally. He takes ibuprofen at home, last dose was 2 days ago.    Past Medical History  Past Medical History:   Diagnosis Date    Depressive disorder      Past Surgical History:   Procedure Laterality Date    ORTHOPEDIC SURGERY      back surgery     acetaminophen (TYLENOL) 325 MG tablet  amoxicillin-clavulanate (AUGMENTIN) 875-125 MG tablet  azithromycin (ZITHROMAX Z-WAN) 250 MG tablet  cetirizine (ZYRTEC) 10 MG tablet  famotidine (PEPCID) 20 MG tablet  fluticasone (FLONASE) 50 MCG/ACT spray  HYDROcodone-acetaminophen (NORCO) 5-325 MG per tablet  ipratropium (ATROVENT) 0.06 % nasal spray  lidocaine (LIDODERM) 5 % patch  metFORMIN (GLUCOPHAGE) 500 MG tablet  methylPREDNISolone (MEDROL DOSEPAK) 4 MG tablet therapy pack  sodium chloride (NASAL MOIST) 0.65 % nasal spray  tamsulosin (FLOMAX) 0.4 MG capsule      No Known Allergies  Family History  History reviewed. No pertinent family history.  Social History   Social History     Tobacco Use    Smoking status: Former     Current packs/day: 0.00     Types: Cigarettes     Quit date: 2003     Years since quittin.1    Smokeless tobacco:  Never   Substance Use Topics    Alcohol use: Yes     Comment: sometimes    Drug use: No      Past medical history, past surgical history, medications, allergies, family history, and social history were reviewed with the patient. No additional pertinent items.   A medically appropriate review of systems was performed with pertinent positives and negatives noted in the HPI, and all other systems negative.    Physical Exam   BP: (!) 154/101  Pulse: 74  Temp: 97.6  F (36.4  C)  Resp: 16  Weight: 93.8 kg (206 lb 11.2 oz)  SpO2: 97 %  Physical Exam  General:  No acute distress.  HENT:  Normocephalic and atraumatic.   Eyes: EOMI. Conjunctivae normal.   Cardiovascular: Normal rate and regular rhythm.  Normal heart sounds. No murmur heard.  Pulmonary:  No respiratory distress. Normal breath sounds.   Abdominal: There is no distension.  Abdomen is soft. There is no mass.  There is no abdominal tenderness.   Musculoskeletal: No midline spinal tenderness or bony step-off.  Mild tenderness to left lumbar paraspinal musculature as well as left lateral hip and posterior left thigh.  Positive straight leg raise bilaterally reproducing pain of the left lower back.  Normal range of motion of all joints without any pain or clicking.  Normal logroll without pain.  Equal pulses.  Grossly intact strength and sensation throughout.  Normal gait.  Normal gluteal squeeze.  Slight tenderness in the left lateral hip and posterior thigh.  No edema.  Negative Homans' sign.  Skin: Warm and dry no rash  Neurological: No focal deficit present.   Mood and Affect: Mood normal.       ED Course, Procedures, & Data      Procedures                   Medications - No data to display       Critical care was not performed.     Medical Decision Making  The patient's presentation was of moderate complexity (a chronic illness mild to moderate exacerbation, progression, or side effect of treatment).    The patient's evaluation involved:  review of external  note(s) from 3+ sources (see separate area of note for details)  review of 3+ test result(s) ordered prior to this encounter (see separate area of note for details)  strong consideration of a test (see separate area of note for details) that was ultimately deferred  ordering and/or review of 3+ test(s) in this encounter (see separate area of note for details)  independent interpretation of testing performed by another health professional (see separate area of note for details)  The patient's management necessitated moderate risk (prescription drug management including medications given in the ED).    Assessment & Plan    Patient arrived to the emergency department with chief complaint of low back pain that radiated down his left thigh, on exam he is afebrile in no acute distress.  He has mild left lumbar paraspinal tenderness, with positive straight leg raise.  Tenderness to the left lateral hip and left posterior thigh.  No infectious symptoms.  No trauma.  No overlying skin changes.  No red flag symptoms for cauda equina.  No neurologic abnormalities on exam.  No bony tendrness or difficulty with ROM.      Blood work negative for electrolyte abnormalities that may cause thigh muscle cramping.  Ultrasound negative for DVT.  No imaging performed at this time as he did not have any bony tenderness or difficulty with joint range of motion.  Exam was completely unremarkable.  Provided supportive care with Tylenol, ibuprofen, Lidoderm patch and Flexeril.  On reevaluation, patient felt improved, discussed following up with outpatient care team    Discussed supportive care with over-the-counter medications, lidocaine patches, heat, ice packs, stretching.  Discussed continuing to walk.  Discussed physical therapy, following up with his neurosurgery team at [facility].  However if he did want to see our neurosurgery colleagues here, a referral was placed.  I did provide him with a prescription for Flexeril to take as needed.   Patient was discharged with return precautions      I have reviewed the nursing notes. I have reviewed the findings, diagnosis, plan and need for follow up with the patient.    New Prescriptions    No medications on file       Final diagnoses:   None       I, Adiel Jerez, am serving as a trained medical scribe to document services personally performed by Mary Pryor DO based on the provider's statements to me on June 22, 2025.  This document has been checked and approved by the attending provider.    I, Mary Pryor DO, was physically present and have reviewed and verified the accuracy of this note documented by Adiel Jerez, medical scribe.      Mary Pryor DO  [facility] EMERGENCY DEPARTMENT  6/22/2025     Mary Pryor DO  06/22/25 1558

## 2025-06-24 ENCOUNTER — PATIENT OUTREACH (OUTPATIENT)
Dept: CARE COORDINATION | Facility: CLINIC | Age: 55
End: 2025-06-24
Payer: MEDICAID

## 2025-06-26 ENCOUNTER — PATIENT OUTREACH (OUTPATIENT)
Dept: CARE COORDINATION | Facility: CLINIC | Age: 55
End: 2025-06-26
Payer: MEDICAID

## 2025-07-06 ENCOUNTER — HOSPITAL ENCOUNTER (EMERGENCY)
Facility: CLINIC | Age: 55
Discharge: HOME | End: 2025-07-06
Payer: MEDICAID

## 2025-07-06 ENCOUNTER — APPOINTMENT (OUTPATIENT)
Dept: GENERAL RADIOLOGY | Facility: CLINIC | Age: 55
End: 2025-07-06
Attending: FAMILY MEDICINE
Payer: MEDICAID

## 2025-07-06 VITALS
OXYGEN SATURATION: 97 % | SYSTOLIC BLOOD PRESSURE: 147 MMHG | BODY MASS INDEX: 30.96 KG/M2 | DIASTOLIC BLOOD PRESSURE: 95 MMHG | RESPIRATION RATE: 14 BRPM | WEIGHT: 209 LBS | HEART RATE: 82 BPM | TEMPERATURE: 98.5 F | HEIGHT: 69 IN

## 2025-07-06 DIAGNOSIS — M25.562 CHRONIC PAIN OF LEFT KNEE: ICD-10-CM

## 2025-07-06 DIAGNOSIS — R51.9 ACUTE NONINTRACTABLE HEADACHE, UNSPECIFIED HEADACHE TYPE: ICD-10-CM

## 2025-07-06 DIAGNOSIS — G89.29 CHRONIC PAIN OF LEFT KNEE: ICD-10-CM

## 2025-07-06 DIAGNOSIS — J31.2 CHRONIC SORE THROAT: ICD-10-CM

## 2025-07-06 DIAGNOSIS — M17.12 TRICOMPARTMENT OSTEOARTHRITIS OF LEFT KNEE: ICD-10-CM

## 2025-07-06 LAB — S PYO DNA THROAT QL NAA+PROBE: NOT DETECTED

## 2025-07-06 PROCEDURE — 250N000013 HC RX MED GY IP 250 OP 250 PS 637

## 2025-07-06 PROCEDURE — 99284 EMERGENCY DEPT VISIT MOD MDM: CPT

## 2025-07-06 PROCEDURE — 73562 X-RAY EXAM OF KNEE 3: CPT | Mod: LT

## 2025-07-06 PROCEDURE — 99284 EMERGENCY DEPT VISIT MOD MDM: CPT | Mod: 25

## 2025-07-06 PROCEDURE — 96372 THER/PROPH/DIAG INJ SC/IM: CPT

## 2025-07-06 PROCEDURE — 87651 STREP A DNA AMP PROBE: CPT

## 2025-07-06 PROCEDURE — 250N000011 HC RX IP 250 OP 636

## 2025-07-06 RX ORDER — OXYCODONE HYDROCHLORIDE 5 MG/1
5 TABLET ORAL ONCE
Refills: 0 | Status: COMPLETED | OUTPATIENT
Start: 2025-07-06 | End: 2025-07-06

## 2025-07-06 RX ORDER — ATORVASTATIN CALCIUM 40 MG/1
40 TABLET, FILM COATED ORAL DAILY
COMMUNITY
Start: 2024-02-12

## 2025-07-06 RX ORDER — ACETAMINOPHEN 500 MG
1000 TABLET ORAL ONCE
Status: COMPLETED | OUTPATIENT
Start: 2025-07-06 | End: 2025-07-06

## 2025-07-06 RX ORDER — OXYCODONE HYDROCHLORIDE 5 MG/1
5 TABLET ORAL EVERY 6 HOURS PRN
Qty: 5 TABLET | Refills: 0 | Status: SHIPPED | OUTPATIENT
Start: 2025-07-06

## 2025-07-06 RX ORDER — KETOROLAC TROMETHAMINE 30 MG/ML
30 INJECTION, SOLUTION INTRAMUSCULAR; INTRAVENOUS ONCE
Status: COMPLETED | OUTPATIENT
Start: 2025-07-06 | End: 2025-07-06

## 2025-07-06 RX ORDER — LOSARTAN POTASSIUM 25 MG/1
25 TABLET ORAL DAILY
COMMUNITY
Start: 2024-02-12

## 2025-07-06 RX ADMIN — KETOROLAC TROMETHAMINE 30 MG: 30 INJECTION, SOLUTION INTRAMUSCULAR; INTRAVENOUS at 14:59

## 2025-07-06 RX ADMIN — ACETAMINOPHEN 1000 MG: 500 TABLET ORAL at 14:59

## 2025-07-06 RX ADMIN — OXYCODONE HYDROCHLORIDE 5 MG: 5 TABLET ORAL at 16:26

## 2025-07-06 ASSESSMENT — COLUMBIA-SUICIDE SEVERITY RATING SCALE - C-SSRS
2. HAVE YOU ACTUALLY HAD ANY THOUGHTS OF KILLING YOURSELF IN THE PAST MONTH?: NO
1. IN THE PAST MONTH, HAVE YOU WISHED YOU WERE DEAD OR WISHED YOU COULD GO TO SLEEP AND NOT WAKE UP?: NO
6. HAVE YOU EVER DONE ANYTHING, STARTED TO DO ANYTHING, OR PREPARED TO DO ANYTHING TO END YOUR LIFE?: NO

## 2025-07-06 ASSESSMENT — ACTIVITIES OF DAILY LIVING (ADL)
ADLS_ACUITY_SCORE: 41

## 2025-07-06 NOTE — DISCHARGE INSTRUCTIONS
Continue Tylenol (max 4000 g in 24 hours) and ibuprofen (max 3200 mg in 24 hours) for headache recurrence and for acute on chronic knee pain  You may utilize topical therapies to your knee such as Voltaren gel and/or lidocaine patches to help with knee pain  A referral was sent for orthopedic surgery establishment of care and follow-up so they will call you to set up this appointment for further evaluation and management of your acute on chronic knee pain  A referral for ENT was sent from the ED today for follow-up on your chronic sore throat they will call you to set up this outpatient appointment  Utilize oxycodone only as needed for severe, breakthrough pain of your knee.  Do not work, drive, operate heavy machinery while taking this medication.  Do not self or give this medication to anyone other than yourself for pain.  Otherwise, do not hesitate to seek urgent or emergent reevaluation if you have any worsening or concerning signs or symptoms.

## 2025-07-06 NOTE — ED TRIAGE NOTES
Pt has for two weeks has pain in his left knee.  Difficulty walking;  Pain is 8/10 pain. Pt took ibuprofen 600mg/tylenol with no improvement. Last dose was yesterday.  No known injury, similar issue approx 2 years ago where pt had exray and possible steroid injection at that time. Improvement with that treatment.     Pt also has pain headache and pressure which might be result of blood pressure hx. .

## 2025-07-06 NOTE — ED PROVIDER NOTES
[facility] EMERGENCY DEPARTMENT ([facility])    25      ED PROVIDER NOTE   Vertical Triage A  History     Chief Complaint   Patient presents with    Knee Pain     Left.      The history is provided by the patient and medical records.     Zak Bey is a 55 year old male who presents to the emergency department with left knee pain for the past 2 weeks.  He has had difficulty walking as a result.  He rates his current knee pain at  8/10 intensity.  He has tried ibuprofen 600mg/tylenol with no improvement. Last dose was yesterday.  No known injury, but did have similar issue approximately 2 years ago.  He states that at that time he had xray and possible steroid injection, had some improvement with that treatment. Patient also has headache and pressure, feels this may be related to his history of high blood pressure.    Past Medical History  Past Medical History:   Diagnosis Date    Depressive disorder      Past Surgical History:   Procedure Laterality Date    ORTHOPEDIC SURGERY      back surgery     acetaminophen (TYLENOL) 325 MG tablet  atorvastatin (LIPITOR) 40 MG tablet  losartan (COZAAR) 25 MG tablet  metFORMIN (GLUCOPHAGE) 500 MG tablet  amoxicillin-clavulanate (AUGMENTIN) 875-125 MG tablet  azithromycin (ZITHROMAX Z-WAN) 250 MG tablet  cetirizine (ZYRTEC) 10 MG tablet  famotidine (PEPCID) 20 MG tablet  fluticasone (FLONASE) 50 MCG/ACT spray  HYDROcodone-acetaminophen (NORCO) 5-325 MG per tablet  ipratropium (ATROVENT) 0.06 % nasal spray  lidocaine (LIDODERM) 5 % patch  methylPREDNISolone (MEDROL DOSEPAK) 4 MG tablet therapy pack  sodium chloride (NASAL MOIST) 0.65 % nasal spray  tamsulosin (FLOMAX) 0.4 MG capsule      No Known Allergies  Family History  History reviewed. No pertinent family history.  Social History   Social History     Tobacco Use    Smoking status: Former     Current packs/day: 0.00     Types: Cigarettes     Quit date: 2003     Years since quittin.2     "Smokeless tobacco: Never   Substance Use Topics    Alcohol use: Yes     Comment: sometimes    Drug use: No      A medically appropriate review of systems was performed with pertinent positives and negatives noted in the HPI, and all other systems negative.    Physical Exam   BP: (!) 147/95  Pulse: 82  Temp: 98.5  F (36.9  C)  Resp: 14  Height: 175.3 cm (5' 9\")  Weight: 94.8 kg (209 lb)  SpO2: 97 %    Physical Exam  ***    ED Course, Procedures, & Data      Procedures       {ED Course Selections (Optional):315792}  {ED Sepsis CMS Documentation (Optional):440975::\" \"}          Medications - No data to display       {Critical Care Performed?:370190}    Assessment & Plan    ***    I have reviewed the nursing notes. I have reviewed the findings, diagnosis, plan and need for follow up with the patient.    New Prescriptions    No medications on file       Final diagnoses:   None       ***  [facility] EMERGENCY DEPARTMENT  7/6/2025  " laxity.     Instability Tests: Anterior drawer test negative. Posterior drawer test negative. Medial Gregory test negative and lateral Gregory test negative.      Right lower leg: No edema.      Left lower leg: No edema.   Neurological:      Mental Status: He is alert.   Psychiatric:         Mood and Affect: Mood normal.         Behavior: Behavior normal.           ED Course, Procedures, & Data      Procedures                Results for orders placed or performed during the hospital encounter of 07/06/25   XR Knee Left 3 Views    Impression    IMPRESSION: Mild tricompartmental arthritic changes. Prepatellar soft tissue thickening/swelling. There is a left knee joint effusion. Small well-corticated bone fragment along the medial joint line is chronic in appearance, as is the well-corticated   bone fragment near the fibular head. Prepatellar soft tissue thickening versus swelling.   Group A Streptococcus PCR Throat Swab    Specimen: Throat; Swab   Result Value Ref Range    Group A strep by PCR Not Detected Not Detected     Medications   ketorolac (TORADOL) injection 30 mg (30 mg Intramuscular $Given 7/6/25 1875)   acetaminophen (TYLENOL) tablet 1,000 mg (1,000 mg Oral $Given 7/6/25 2714)   oxyCODONE (ROXICODONE) tablet 5 mg (5 mg Oral $Given 7/6/25 3916)          Critical care was not performed.     Medical Decision Making  The patient's presentation was of moderate complexity (an acute complicated injury).    The patient's evaluation involved:  review of external note(s) from 1 sources (Previous ED encounters)  ordering and/or review of 2 test(s) in this encounter (see separate area of note for details)    The patient's management necessitated moderate risk (prescription drug management including medications given in the ED).    Assessment & Plan    Zak is a 55-year-old male that presented to the ED for sore throat and acute on chronic left knee pain.  Mildly elevated blood pressure 147/95 but otherwise  afebrile and without fever or hypoxia.  Patient in no acute distress nontoxic-appearing throughout the ED visit.  Some posterior oropharynx erythema without exudate.  No trismus, muffled voice, tripod appearance, or drooling.  Tenderness throughout the left knee without any focal point tenderness or pain.  No appreciable swelling when compared to the right knee.  No overlying skin changes indicating an infectious process.  X-ray left knee with mild tricompartmental arthritic changes and prepatellar soft tissue thickening/swelling as well as a left knee joint effusion. Small well-corticated bone fragment along the medial joint line is chronic in appearance as well as near the fibular head.  Do not believe patient requires any further advanced imaging at this time.  Group A strep negative.  Discussed imaging and lab findings with the patient and his family at length.  Discussed the recommendation for outpatient follow-up with orthopedic surgery for further investigation into his pain, further treatment, and discussions of surgery in the future as needed.  At this time patient is stable for discharge home.  Strict return precautions given.  Rx oxycodone as needed for severe, breakthrough pain but otherwise continue Tylenol ibuprofen on a regular basis.  ENT and orthopedic surgery referral sent from the ED today for his chronic sore throat and left knee pain respectively.  Patient was agreeable to the discharge treatment plan, voiced understanding, and all questions answered prior to discharge.    I have reviewed the nursing notes. I have reviewed the findings, diagnosis, plan and need for follow up with the patient.    Discharge Medication List as of 7/6/2025  4:27 PM        START taking these medications    Details   oxyCODONE (ROXICODONE) 5 MG tablet Take 1 tablet (5 mg) by mouth every 6 hours as needed for breakthrough pain or severe pain., Disp-5 tablet, R-0, E-Prescribe             Final diagnoses:   Chronic pain  of left knee   Acute nonintractable headache, unspecified headache type   Tricompartment osteoarthritis of left knee   Chronic sore throat       Erendira Lo PA-C     [facility] EMERGENCY DEPARTMENT  7/6/2025     Erendira Lo PA-C  07/26/25 1808

## 2025-07-07 ENCOUNTER — PATIENT OUTREACH (OUTPATIENT)
Dept: CARE COORDINATION | Facility: CLINIC | Age: 55
End: 2025-07-07
Payer: MEDICAID

## 2025-07-08 NOTE — TELEPHONE ENCOUNTER
FUTURE VISIT INFORMATION:  Appointment Date: 10/15/25   Appointment Time:  8:20 AM   REFERRAL INFORMATION:  Referring By: Erendira Lo PA-C   Referring Clinic: [facility]  Reason for Visit/Diagnosis: DX J31.2 (ICD-10-CM) - Chronic sore throat  REF'D by Erendira Lo PA-C  Scheduled w/ daughter, Kassy   CSC verified   Records in [facility]   NEEDED      NOTES STATUS DETAILS   HOSPITAL DISCHARGE SUMMARY/ ER visit [facility]  7/6/25 ER note: Erendira Lo PA-C    IMAGES *pertaining images & report*       CT, MRI, PET, NM, US, XRAYS, etc ... PACS [facility]  12/13/24 XR chest  7/1/19 CT chest  7/1/19 CT cervical

## 2025-07-22 ENCOUNTER — OFFICE VISIT (OUTPATIENT)
Dept: ORTHOPEDICS | Facility: CLINIC | Age: 55
End: 2025-07-22
Payer: MEDICAID

## 2025-07-22 DIAGNOSIS — M17.12 TRICOMPARTMENT OSTEOARTHRITIS OF LEFT KNEE: ICD-10-CM

## 2025-07-22 DIAGNOSIS — M25.562 CHRONIC PAIN OF LEFT KNEE: ICD-10-CM

## 2025-07-22 DIAGNOSIS — G89.29 CHRONIC PAIN OF LEFT KNEE: ICD-10-CM

## 2025-07-22 PROCEDURE — 99203 OFFICE O/P NEW LOW 30 MIN: CPT | Mod: 25 | Performed by: FAMILY MEDICINE

## 2025-07-22 PROCEDURE — T1013 SIGN LANG/ORAL INTERPRETER: HCPCS

## 2025-07-22 PROCEDURE — 20610 DRAIN/INJ JOINT/BURSA W/O US: CPT | Mod: LT | Performed by: FAMILY MEDICINE

## 2025-07-22 RX ORDER — TRIAMCINOLONE ACETONIDE 40 MG/ML
40 INJECTION, SUSPENSION INTRA-ARTICULAR; INTRAMUSCULAR
Status: COMPLETED | OUTPATIENT
Start: 2025-07-22 | End: 2025-07-22

## 2025-07-22 RX ORDER — LIDOCAINE HYDROCHLORIDE 10 MG/ML
4 INJECTION, SOLUTION EPIDURAL; INFILTRATION; INTRACAUDAL; PERINEURAL
Status: COMPLETED | OUTPATIENT
Start: 2025-07-22 | End: 2025-07-22

## 2025-07-22 RX ADMIN — LIDOCAINE HYDROCHLORIDE 4 ML: 10 INJECTION, SOLUTION EPIDURAL; INFILTRATION; INTRACAUDAL; PERINEURAL at 08:54

## 2025-07-22 RX ADMIN — TRIAMCINOLONE ACETONIDE 40 MG: 40 INJECTION, SUSPENSION INTRA-ARTICULAR; INTRAMUSCULAR at 08:54

## 2025-07-22 SDOH — HEALTH STABILITY: PHYSICAL HEALTH: ON AVERAGE, HOW MANY DAYS PER WEEK DO YOU ENGAGE IN MODERATE TO STRENUOUS EXERCISE (LIKE A BRISK WALK)?: 3 DAYS

## 2025-07-22 NOTE — PROGRESS NOTES
[facility] CLINICS AND SURGERY CENTER  SPORTS & ORTHOPEDIC CLINIC VISIT     Jul 22, 2025        ASSESSMENT & PLAN    55-year-old with medial left knee pain that is likely due to osteoarthritis exacerbation    Reviewed imaging and assessment with patient in detail  - Use knee brace with activity as needed for comfort  -Use topical diclofenac (Voltaren gel) on the knee up to 4 times daily as needed for pain  -Continue with regular physical activity and home exercise regimen  -injection given today. See procedure note for details    Jesse Crow MD  [facility] SPORTS MEDICINE CLINIC [place]    -----  Chief Complaint   Patient presents with    Left Knee - Pain       SUBJECTIVE  Zak Bey is a/an 55 year old male who is seen in consultation at the request of  Erendira Lo PA-C for evaluation of  left knee pain.     The patient is seen with an .  The patient is Right handed    Onset: 3 month(s) ago. Reports insidious onset without acute precipitating event.  Location of Pain: left medial knee   Worsened by: Walking, working   Better with: Tylenol, Ibuprofen, oxycodone   Treatments tried: Tylenol, ibuprofen, and Oxycodone, CSI- 2 years ago and helped for about 2 years. Also previously tried PT without benefit  Associated symptoms: no distal numbness or tingling; denies swelling or warmth    Orthopedic/Surgical history: NO  Social History/Occupation:        REVIEW OF SYSTEMS:  Do you have fever, chills, weight loss? No  Do you have any vision problems? No  Do you have any chest pain or edema? No  Do you have any shortness of breath or wheezing?  No  Do you have stomach problems? No  Do you have any numbness or focal weakness? No  Do you have diabetes? No  Do you have problems with bleeding or clotting? No  Do you have an rashes or other skin lesions? No    OBJECTIVE:  There were no vitals taken for this visit.     Patient is alert, No acute distress, pleasant and  conversational.    Gait: nonantalgic. Normal heel toe gait.      left knee:   Skin intact. No erythema or ecchymosis.  No effusion or soft tissue swelling.    AROM: Zero to approximately 135  without restriction or reported pain.    Palpation: No medial or lateral facet joint tenderness.  TTP posterior medial joint line    Special Tests:  Negative bounce test, negative forced flexion and negative Gregory's.  No ligamentous laxity or pain with valgus or varus stress.  Negative Lachman's, Anterior Drawer and Posterior Drawer     Full Isometric quad strength, extensor mechanism in place     Neurovascularly intact in the lower extremity        RADIOLOGY:    3 view xrays of left knee performed 7/6/25 and reviewed independently demonstrating no acute findings. Tricompartmental djd with mild joint space loss. See EMR for formal radiology report.       Large Joint Injection/Arthocentesis: L knee joint    Date/Time: 7/22/2025 8:54 AM    Performed by: Jesse Crow MD  Authorized by: Jesse Crow MD    Indications:  Osteoarthritis  Needle Size:  22 G  Guidance: landmark guided    Approach:  Lateral  Location:  Knee      Medications:  40 mg triamcinolone 40 MG/ML; 4 mL lidocaine (PF) 1 %  Outcome:  Tolerated well, no immediate complications  Procedure discussed: discussed risks, benefits, and alternatives    Consent Given by:  Patient  Timeout: timeout called immediately prior to procedure    Prep: patient was prepped and draped in usual sterile fashion     There were no complications. The patient tolerated the procedure well. There was minimal bleeding.   The patient was instructed to ice the knee upon leaving clinic and refrain from overuse over the next 2 days.   The patient was instructed to go to the emergency room with any unusual pain, swelling, or redness occurred in the injected area.     Jesse Crow MD

## 2025-07-22 NOTE — NURSING NOTE
[address] [address]  Dept: [phone]  ______________________________________________________________________________    Patient: Zka Bey   : 1970   MRN: [id number]   2025    INVASIVE PROCEDURE SAFETY CHECKLIST    Date: 2025   Procedure:Left knee CSI   Patient Name: Zak Bey  MRN: [id number]  YOB: 1970    Action: Complete sections as appropriate. Any discrepancy results in a HARD COPY until resolved.     PRE PROCEDURE:  Patient ID verified with 2 identifiers (name and  or MRN): Yes  Procedure and site verified with patient/designee (when able): Yes  Accurate consent documentation in medical record: Yes  H&P (or appropriate assessment) documented in medical record: Yes  H&P must be up to 20 days prior to procedure and updates within 24 hours of procedure as applicable: Yes  Relevant diagnostic and radiology test results appropriately labeled and displayed as applicable: Yes  Procedure site(s) marked with provider initials: NA    TIMEOUT:  Time-Out performed immediately prior to starting procedure, including verbal and active participation of all team members addressing the following:Yes  * Correct patient identify  * Confirmed that the correct side and site are marked  * An accurate procedure consent form  * Agreement on the procedure to be done  * Correct patient position  * Relevant images and results are properly labeled and appropriately displayed  * The need to administer antibiotics or fluids for irrigation purposes during the procedure as applicable   * Safety precautions based on patient history or medication use    DURING PROCEDURE: Verification of correct person, site, and procedures any time the responsibility for care of the patient is transferred to another member of the care team.       Prior to injection, verified patient identity using patient's name and  date of birth.  Due to injection administration, patient instructed to remain in clinic for 15 minutes  afterwards, and to report any adverse reaction to me immediately.    Joint injection was performed.      Drug Amount Wasted:  None.  Vial/Syringe: Single dose vial  Expiration Date:  4/30/27 1/30/29      Ann De Paz, [facility]  July 22, 2025

## 2025-07-22 NOTE — LETTER
7/22/2025      RE: Zak Bey  [address]  [facility] [address]     Dear Colleague,    Thank you for referring your patient, Zak Bey, to the [facility] SPORTS MEDICINE [facility]. Please see a copy of my visit note below.      [facility] CLINICS AND SURGERY CENTER  SPORTS & ORTHOPEDIC CLINIC VISIT     Jul 22, 2025        ASSESSMENT & PLAN    55-year-old with medial left knee pain that is likely due to osteoarthritis exacerbation    Reviewed imaging and assessment with patient in detail  - Use knee brace with activity as needed for comfort  -Use topical diclofenac (Voltaren gel) on the knee up to 4 times daily as needed for pain  -Continue with regular physical activity and home exercise regimen  -injection given today. See procedure note for details    Jesse Crow MD  [facility] SPORTS MEDICINE [facility]    -----  Chief Complaint   Patient presents with     Left Knee - Pain       SUBJECTIVE  Zak Bey is a/an 55 year old male who is seen in consultation at the request of  Erendira Lo PA-C for evaluation of  left knee pain.     The patient is seen with an .  The patient is Right handed    Onset: 3 month(s) ago. Reports insidious onset without acute precipitating event.  Location of Pain: left medial knee   Worsened by: Walking, working   Better with: Tylenol, Ibuprofen, oxycodone   Treatments tried: Tylenol, ibuprofen, and Oxycodone, CSI- 2 years ago and helped for about 2 years. Also previously tried PT without benefit  Associated symptoms: no distal numbness or tingling; denies swelling or warmth    Orthopedic/Surgical history: NO  Social History/Occupation: [employer]       REVIEW OF SYSTEMS:  Do you have fever, chills, weight loss? No  Do you have any vision problems? No  Do you have any chest pain or edema? No  Do you have any shortness of breath or wheezing?  No  Do you have stomach problems? No  Do you  have any numbness or focal weakness? No  Do you have diabetes? No  Do you have problems with bleeding or clotting? No  Do you have an rashes or other skin lesions? No    OBJECTIVE:  There were no vitals taken for this visit.     Patient is alert, No acute distress, pleasant and conversational.    Gait: nonantalgic. Normal heel toe gait.      left knee:   Skin intact. No erythema or ecchymosis.  No effusion or soft tissue swelling.    AROM: Zero to approximately 135  without restriction or reported pain.    Palpation: No medial or lateral facet joint tenderness.  TTP posterior medial joint line    Special Tests:  Negative bounce test, negative forced flexion and negative Gregory's.  No ligamentous laxity or pain with valgus or varus stress.  Negative Lachman's, Anterior Drawer and Posterior Drawer     Full Isometric quad strength, extensor mechanism in place     Neurovascularly intact in the lower extremity        RADIOLOGY:    3 view xrays of left knee performed 7/6/25 and reviewed independently demonstrating no acute findings. Tricompartmental djd with mild joint space loss. See EMR for formal radiology report.       Large Joint Injection/Arthocentesis: L knee joint    Date/Time: 7/22/2025 8:54 AM    Performed by: Jesse Crow MD  Authorized by: Jesse Crow MD    Indications:  Osteoarthritis  Needle Size:  22 G  Guidance: landmark guided    Approach:  Lateral  Location:  Knee      Medications:  40 mg triamcinolone 40 MG/ML; 4 mL lidocaine (PF) 1 %  Outcome:  Tolerated well, no immediate complications  Procedure discussed: discussed risks, benefits, and alternatives    Consent Given by:  Patient  Timeout: timeout called immediately prior to procedure    Prep: patient was prepped and draped in usual sterile fashion     There were no complications. The patient tolerated the procedure well. There was minimal bleeding.   The patient was instructed to ice the knee upon leaving clinic and refrain  from overuse over the next 2 days.   The patient was instructed to go to the emergency room with any unusual pain, swelling, or redness occurred in the injected area.     Jesse Crow MD            Again, thank you for allowing me to participate in the care of your patient.      Sincerely,    Jesse Crow MD

## 2025-10-15 ENCOUNTER — PRE VISIT (OUTPATIENT)
Dept: OTOLARYNGOLOGY | Facility: CLINIC | Age: 55
End: 2025-10-15

## (undated) RX ORDER — LIDOCAINE HYDROCHLORIDE 10 MG/ML
INJECTION, SOLUTION EPIDURAL; INFILTRATION; INTRACAUDAL; PERINEURAL
Status: DISPENSED
Start: 2025-07-22

## (undated) RX ORDER — TRIAMCINOLONE ACETONIDE 40 MG/ML
INJECTION, SUSPENSION INTRA-ARTICULAR; INTRAMUSCULAR
Status: DISPENSED
Start: 2025-07-22